# Patient Record
Sex: FEMALE | Employment: PART TIME
[De-identification: names, ages, dates, MRNs, and addresses within clinical notes are randomized per-mention and may not be internally consistent; named-entity substitution may affect disease eponyms.]

---

## 2017-03-01 DIAGNOSIS — Z20.828 EXPOSURE TO THE FLU: Primary | ICD-10-CM

## 2017-03-01 RX ORDER — OSELTAMIVIR PHOSPHATE 75 MG/1
75 CAPSULE ORAL DAILY
Qty: 10 CAPSULE | Refills: 0 | Status: SHIPPED | OUTPATIENT
Start: 2017-03-01 | End: 2018-03-20

## 2017-12-17 ENCOUNTER — HEALTH MAINTENANCE LETTER (OUTPATIENT)
Age: 42
End: 2017-12-17

## 2018-03-20 ENCOUNTER — OFFICE VISIT (OUTPATIENT)
Dept: FAMILY MEDICINE | Facility: CLINIC | Age: 43
End: 2018-03-20
Payer: COMMERCIAL

## 2018-03-20 ENCOUNTER — NURSE TRIAGE (OUTPATIENT)
Dept: NURSING | Facility: CLINIC | Age: 43
End: 2018-03-20

## 2018-03-20 VITALS
SYSTOLIC BLOOD PRESSURE: 128 MMHG | TEMPERATURE: 97.4 F | WEIGHT: 266 LBS | RESPIRATION RATE: 18 BRPM | BODY MASS INDEX: 39.86 KG/M2 | OXYGEN SATURATION: 97 % | DIASTOLIC BLOOD PRESSURE: 84 MMHG | HEART RATE: 83 BPM

## 2018-03-20 DIAGNOSIS — R10.11 RUQ ABDOMINAL PAIN: Primary | ICD-10-CM

## 2018-03-20 DIAGNOSIS — R30.0 DYSURIA: ICD-10-CM

## 2018-03-20 DIAGNOSIS — Z87.19 HISTORY OF GALLSTONES: ICD-10-CM

## 2018-03-20 LAB
ALBUMIN UR-MCNC: 30 MG/DL
APPEARANCE UR: CLEAR
BACTERIA #/AREA URNS HPF: ABNORMAL /HPF
BILIRUB UR QL STRIP: NEGATIVE
COLOR UR AUTO: YELLOW
GLUCOSE UR STRIP-MCNC: NEGATIVE MG/DL
HGB UR QL STRIP: ABNORMAL
KETONES UR STRIP-MCNC: NEGATIVE MG/DL
LEUKOCYTE ESTERASE UR QL STRIP: NEGATIVE
MUCOUS THREADS #/AREA URNS LPF: PRESENT /LPF
NITRATE UR QL: NEGATIVE
NON-SQ EPI CELLS #/AREA URNS LPF: ABNORMAL /LPF
PH UR STRIP: 6 PH (ref 5–7)
RBC #/AREA URNS AUTO: ABNORMAL /HPF
SOURCE: ABNORMAL
SP GR UR STRIP: 1.02 (ref 1–1.03)
UROBILINOGEN UR STRIP-ACNC: 1 EU/DL (ref 0.2–1)
WBC #/AREA URNS AUTO: ABNORMAL /HPF

## 2018-03-20 PROCEDURE — 99214 OFFICE O/P EST MOD 30 MIN: CPT | Performed by: NURSE PRACTITIONER

## 2018-03-20 PROCEDURE — 87086 URINE CULTURE/COLONY COUNT: CPT | Performed by: NURSE PRACTITIONER

## 2018-03-20 PROCEDURE — 81001 URINALYSIS AUTO W/SCOPE: CPT | Performed by: NURSE PRACTITIONER

## 2018-03-20 ASSESSMENT — PAIN SCALES - GENERAL: PAINLEVEL: WORST PAIN (10)

## 2018-03-20 NOTE — TELEPHONE ENCOUNTER
Patient states she was diagnosed with gall stones July of 2017 and is requesting to have surgery.  FNA advised that she would have to see a provider to initiate if that is what is needed; transferred to scheduling for appointment.

## 2018-03-20 NOTE — PROGRESS NOTES
SUBJECTIVE:   Minerva Andrade is a 42 year old female who presents to clinic today for the following health issues:      Abdominal Pain      Duration: On and Off for 7 months    Description (location/character/radiation): Upper right Abdomen       Associated flank pain: None    Intensity:  severe    Accompanying signs and symptoms:        Fever/Chills: no        Gas/Bloating: YES- Both       Nausea/vomitting: YES- Vomit has been green       Diarrhea: YES       Dysuria or Hematuria: no     History (previous similar pain/trauma/previous testing): 7/2017 was diagnosed with Gall Stones at [facility]    Precipitating or alleviating factors:       Pain worse with eating/BM/urination: with eating-greasy foods especially. Every other meal getting abdominal pain.        Pain relieved by BM: YES    Therapies tried and outcome: Rolaids-not helping    LMP:  Has IUD does not get menstrual cycle with this    Father passed away a few days after getting dx with gallstones last July 2017 so she did not get a chance to see a general surgeon. Pain has worsened since last July. Worse after greasy foods. She is ready to take care of this now.       Issue #2: +Dysuria and foul smelling urine. No blood in urine, mild lower abdominal pain. Will check UA/UC today. No fever/chills.         Problem list and histories reviewed & adjusted, as indicated.  Additional history: as documented    Patient Active Problem List   Diagnosis     BMI 30.0-30.9,adult     Hemoglobin C (Hb-C)   Alpha Thalessemia      Elevated BP     IUD (intrauterine device) in place     Chronic migraine without aura with status migrainosus, not intractable     History of gallstones     Past Surgical History:   Procedure Laterality Date     DILATION AND CURETTAGE SUCTION  2003/08/10    x 3       Social History   Substance Use Topics     Smoking status: Never Smoker     Smokeless tobacco: Never Used     Alcohol use Yes      Comment: 1-11 a year     Family History    Problem Relation Age of Onset     DIABETES Mother      Hypertension Mother      Arthritis Maternal Grandmother      Arthritis Maternal Aunt          Current Outpatient Prescriptions   Medication Sig Dispense Refill     SUMAtriptan (IMITREX) 100 MG tablet Take 1 tablet (100 mg) by mouth at onset of headache for migraine May repeat in 2 hours if needed: max 2/day (Patient not taking: Reported on 3/20/2018) 9 tablet 1     levonorgestrel (MIRENA) 20 MCG/24HR IUD 1 each (20 mcg) by Intrauterine route once for 1 dose 1 each 0     No Known Allergies    Reviewed and updated as needed this visit by clinical staff  Tobacco  Allergies  Meds  Problems  Med Hx  Surg Hx  Fam Hx  Soc Hx        Reviewed and updated as needed this visit by Provider  Allergies  Meds  Problems         ROS:  Constitutional, cardiovascular, pulmonary, gi -as above, and gu-as above, otherwise systems are negative, except as otherwise noted.    OBJECTIVE:     /84  Pulse 83  Temp 97.4  F (36.3  C) (Oral)  Resp 18  Wt 120.7 kg (266 lb)  SpO2 97%  BMI 39.86 kg/m2  Body mass index is 39.86 kg/(m^2).  GENERAL: healthy, alert and no distress  RESP: lungs clear to auscultation - no rales, rhonchi or wheezes  CV: regular rate and rhythm, normal S1 S2, no S3 or S4, no murmur, click or rub  ABDOMEN: soft, obese, tender in RUQ, bilateral lower quadrants, and right flank tender, no hepatosplenomegaly, no masses and bowel sounds hypoactive  MS: no gross musculoskeletal defects noted, no edema    Diagnostic Test Results:  Results for orders placed or performed in visit on 03/20/18 (from the past 24 hour(s))   *UA reflex to Microscopic   Result Value Ref Range    Color Urine Yellow     Appearance Urine Clear     Glucose Urine Negative NEG^Negative mg/dL    Bilirubin Urine Negative NEG^Negative    Ketones Urine Negative NEG^Negative mg/dL    Specific Gravity Urine 1.025 1.003 - 1.035    Blood Urine Large (A) NEG^Negative    pH Urine 6.0 5.0 - 7.0  pH    Protein Albumin Urine 30 (A) NEG^Negative mg/dL    Urobilinogen Urine 1.0 0.2 - 1.0 EU/dL    Nitrite Urine Negative NEG^Negative    Leukocyte Esterase Urine Negative NEG^Negative    Source Midstream Urine    Urine Microscopic   Result Value Ref Range    WBC Urine 0 - 5 OTO5^0 - 5 /HPF    RBC Urine  (A) OTO2^O - 2 /HPF    Squamous Epithelial /LPF Urine Few FEW^Few /LPF    Bacteria Urine Few (A) NEG^Negative /HPF    Mucous Urine Present (A) NEG^Negative /LPF       ASSESSMENT/PLAN:       1. RUQ abdominal pain  Pain worsening since last July 2017. Get US then make appointment with surgeon. Likely gallstones. She wants it surgically taken care of.   - US Abdomen Complete; Future  - GENERAL SURG ADULT REFERRAL    2. History of gallstones  As above  - US Abdomen Complete; Future  - GENERAL SURG ADULT REFERRAL    3. Dysuria  UA not conclusive for UTI, wait for UC. Push fluids, okay to try cranberry pills also.   - *UA reflex to Microscopic  -UC pending    FUTURE APPOINTMENTS:       - Follow-up visit prn symptoms not improving    KAREY Pitt, NP-C  [facility]

## 2018-03-20 NOTE — PATIENT INSTRUCTIONS
Ultrasound number: [phone]     General surgeon- [facility]: [facility]im[facility] [place] [phone]

## 2018-03-20 NOTE — MR AVS SNAPSHOT
After Visit Summary   3/20/2018    Minerva Andrade    MRN: [id number]           Patient Information     Date Of Birth          1975        Visit Information        Provider Department      3/20/2018 11:40 AM Angelica Santos NP [facility]        Today's Diagnoses     RUQ abdominal pain    -  1    History of gallstones        Dysuria          Care Instructions    Ultrasound number: [phone]     General surgeon- [facility]: [facility] [phone]            Follow-ups after your visit        Additional Services     GENERAL SURG ADULT REFERRAL       Your provider has referred you to: [facility]: [facility] [phone]   [email or web address]    Please be aware that coverage of these services is subject to the terms and limitations of your health insurance plan.  Call member services at your health plan with any benefit or coverage questions.      Please bring the following with you to your appointment:    (1) Any X-Rays, CTs or MRIs which have been performed.  Contact the facility where they were done to arrange for  prior to your scheduled appointment.   (2) List of current medications   (3) This referral request   (4) Any documents/labs given to you for this referral                  Future tests that were ordered for you today     Open Future Orders        Priority Expected Expires Ordered    US Abdomen Complete Routine  3/20/2019 3/20/2018            Who to contact     If you have questions or need follow up information about today's clinic visit or your schedule please contact [facility] directly at [phone].  Normal or non-critical lab and imaging results will be communicated to you by MyChart, letter or phone within 4 business days after the clinic has received the results. If you do not hear from us within 7 days, please contact the clinic through MyChart or phone.  If you have a critical or abnormal lab result, we will notify you by phone as soon as possible.  Submit refill requests through [employer] or call your pharmacy and they will forward the refill request to us. Please allow 3 business days for your refill to be completed.          Additional Information About Your Visit        [employer] Information     [employer] gives you secure access to your electronic health record. If you see a primary care provider, you can also send messages to your care team and make appointments. If you have questions, please call your primary care clinic.  If you do not have a primary care provider, please call [phone] and they will assist you.        Care EveryWhere ID     This is your Care EveryWhere ID. This could be used by other organizations to access your [facility] medical records  [id number]        Your Vitals Were     Pulse Temperature Respirations Pulse Oximetry BMI (Body Mass Index)       83 97.4  F (36.3  C) (Oral) 18 97% 39.86 kg/m2        Blood Pressure from Last 3 Encounters:   03/20/18 128/84   09/12/16 114/78   08/17/16 116/74    Weight from Last 3 Encounters:   03/20/18 266 lb (120.7 kg)   09/12/16 249 lb (112.9 kg)   08/17/16 250 lb 1.6 oz (113.4 kg)              We Performed the Following     *UA reflex to Microscopic     GENERAL SURG ADULT REFERRAL     Urine Culture Aerobic Bacterial     Urine Microscopic          Today's Medication Changes          These changes are accurate as of 3/20/18 12:42 PM.  If you have any questions, ask your nurse or doctor.               Stop taking these medicines if you haven't already. Please contact your care team if you have questions.     azithromycin 250 MG tablet   Commonly known as:  ZITHROMAX   Stopped by:  Angelica Santos NP           oseltamivir 75 MG capsule   Commonly known as:  TAMIFLU   Stopped by:  Angelica Santos NP                    Primary Care Provider Office Phone # Fax #    [facility] [phone]  [phone]       [address]  [facility] [address]        Equal Access to Services     DYLON ARGUELLO : Hadii aad ku hadbrynno Sodashawn, waterryda luqadaha, qaybta kaalmada fernandoradha, emmanuel franin hayaasue salasliontu haines. So [facility] [phone].    ATENCIÓN: Si habla español, tiene a pierson disposición servicios gratuitos de asistencia lingüística. Llame al [phone].    We comply with applicable federal civil rights laws and [place] laws. We do not discriminate on the basis of race, color, national origin, age, disability, sex, sexual orientation, or gender identity.            Thank you!     Thank you for choosing [facility]  for your care. Our goal is always to provide you with excellent care. Hearing back from our patients is one way we can continue to improve our services. Please take a few minutes to complete the written survey that you may receive in the mail after your visit with us. Thank you!             Your Updated Medication List - Protect others around you: Learn how to safely use, store and throw away your medicines at www.disposemymeds.org.          This list is accurate as of 3/20/18 12:42 PM.  Always use your most recent med list.                   Brand Name Dispense Instructions for use Diagnosis    levonorgestrel 20 MCG/24HR IUD    MIRENA    1 each    1 each (20 mcg) by Intrauterine route once for 1 dose    Encounter for IUD insertion, Pregnancy, status unknown       SUMAtriptan 100 MG tablet    IMITREX    9 tablet    Take 1 tablet (100 mg) by mouth at onset of headache for migraine May repeat in 2 hours if needed: max 2/day    Chronic migraine without aura with status migrainosus, not intractable

## 2018-03-21 LAB
BACTERIA SPEC CULT: NORMAL
SPECIMEN SOURCE: NORMAL

## 2018-04-04 ENCOUNTER — RADIANT APPOINTMENT (OUTPATIENT)
Dept: ULTRASOUND IMAGING | Facility: CLINIC | Age: 43
End: 2018-04-04
Attending: NURSE PRACTITIONER
Payer: COMMERCIAL

## 2018-04-04 DIAGNOSIS — R10.11 RUQ ABDOMINAL PAIN: ICD-10-CM

## 2018-04-04 DIAGNOSIS — Z87.19 HISTORY OF GALLSTONES: ICD-10-CM

## 2018-04-04 PROCEDURE — 76700 US EXAM ABDOM COMPLETE: CPT | Performed by: RADIOLOGY

## 2018-04-16 ENCOUNTER — TELEPHONE (OUTPATIENT)
Dept: SURGERY | Facility: CLINIC | Age: 43
End: 2018-04-16

## 2018-04-16 ENCOUNTER — OFFICE VISIT (OUTPATIENT)
Dept: SURGERY | Facility: CLINIC | Age: 43
End: 2018-04-16
Attending: NURSE PRACTITIONER
Payer: COMMERCIAL

## 2018-04-16 VITALS
BODY MASS INDEX: 39.4 KG/M2 | HEART RATE: 83 BPM | HEIGHT: 69 IN | DIASTOLIC BLOOD PRESSURE: 95 MMHG | OXYGEN SATURATION: 96 % | WEIGHT: 266 LBS | SYSTOLIC BLOOD PRESSURE: 136 MMHG

## 2018-04-16 DIAGNOSIS — K80.20 SYMPTOMATIC CHOLELITHIASIS: Primary | ICD-10-CM

## 2018-04-16 PROCEDURE — 99244 OFF/OP CNSLTJ NEW/EST MOD 40: CPT | Performed by: SURGERY

## 2018-04-16 ASSESSMENT — PAIN SCALES - GENERAL: PAINLEVEL: NO PAIN (0)

## 2018-04-16 NOTE — PATIENT INSTRUCTIONS
Surgery Instructions    Always follow your surgeon s instructions. If you don t, your surgery could be cancelled. Please use the following checklist.  Your surgery is on: The surgery scheduler will contact you within 1 week of your consult with the surgeon. If you do not hear from them, please call the clinic or RN Care Coordinator for your provider.    Time: Prearrival times can vary depending on location/type of surgery.  [facility] - 2 hour pre-arrival  [facility] - 2 hour pre-arrival  [place] - 1 hour pre-arrival    Note:  These times may change. A nurse will call you before surgery to confirm. If you have not received a call or if you have more questions, please call us on the working day before your surgery:  ? [place]: [phone] or [phone] (9am to 5:30pm)  ? [facility]: [phone] (8am to 6pm)  ? [place]: [phone] (9am to 5pm)  ? [facility] [phone] (7am to 4pm)  Prior to surgery  ? Have a pre-op physical exam with your Primary Doctor within 30 days of surgery  - Ask your doctor to send all of your results to the surgery center/hospital before surgery. Your doctor also may ask you to bring the results with you on the day of surgery.  - Tell your doctor if:  - You are allergic to latex or rubber (latex and rubber gloves are often used in medical care).  - You are taking any medicines (including aspirin), vitamins, or herbal products. You may need to stop taking some medicines before surgery.  - You have any medical problems (allergies, diabetes, or heart disease, for example).  - You have a pacemaker or an AICD (automatic implanted cardiac defibrillator). If you do, please bring the ID card with you on the day of surgery.  - People who smoke have a higher risk of infection after surgery. Ask your doctor how you can quit smoking.  - If you Primary Doctor is not within the [email or web address] system, you will need to have your pre-op physical faxed to us to be scanned into your  chart.  - [place]: [phone] or [phone]  - [facility] ([place]): [phone]  - [facility] ([facility]): [phone]  ? Call your insurance company. Ask if you need pre-approval for your surgery. If you do not have insurance, please let us know. If you wish to speak to the , please alert the clinic staff so this can be arranged.  ? Arrange for someone to drive you home after surgery.  will need to be a responsible adult (18 years or older) that will provide transportation to and from surgery and stay in the waiting room during your surgery. You may not drive yourself or take public transportation to and from surgery.  ? Arrange for someone to stay with you for 24 hours after you go home. This person must be a responsible adult (18 years or older).  ? Call your surgeon or their nurse if there is any change in your health (cold, flu, infections, hospitalizations).  ? Do not smoke, drink alcohol, or take over-the-counter medicine for 24 hours before and after surgery.  ? If you take prescribed drugs, you may need to stop them until after the surgery.  Discuss what medications to take or not take prior to surgery with your Primary Doctor at your pre-op physical. Avoid over-the-counter blood-thinning medications such as Aspirin, Ibuprofen, vitamin E, or fish oil 7 days prior to surgery (unless otherwise directed by your Primary Doctor). Tylenol is a good alternative for mild pain relief prior to surgery.  ? Eating and drinking guidelines prior to surgery:  - Stop all solid food consumption 8 hours prior to surgery  - You may drink clear liquids up to 2 hours prior to surgery (water, fruit juices without pulp, jello, tea/coffee without creamer, sports drinks, clear-fat free broth (bouillon or consomme), popsicles (without milk, bits of fruit, or seeds/nuts)  ? Follow instructions given for showering or bathing before surgery.    - Use 8 ounces of antiseptic surgical soap,  like:  - Hibiclens, Scrub Care, or Exidine  - You can find it at your local pharmacy, clinic, or retail store. If you have trouble, ask your pharmacist to help you find the right substitute.  - Please wash with one of the above soaps twice before coming to the hospital for your surgery. This will decrease bacteria (germs) on your skin. It will also help reduce your chance of infection after surgery.  - Items you will need for showering:  - 4 newly washed washcloths  - 2 newly washed towels  - 8 ounces of one of the above soaps  - Following these instructions:  - The evening before surgery: Shower or bathe as you normally would, using your regular soap and a clean washcloth. Give special attention to places where your incision (surgical cut) or catheters will be. This includes your groin area. Rinse well. You may wash your hair with your regular shampoo. Next, wash your body with 4 ounces of the antiseptic soap. Use a clean, damp washcloth and gently clean your body (from the chin down). If your surgery involves your head, use the special soap on your head and scalp. Rinse well and dry off using a newly washed towel.  - The morning of surgery: Repeat the same process as the evening shower.  - Other suggestions:    Do not shave within 12 inches of your incision (surgical cut) area for at least 3 days before surgery. Shaving can make small cuts in the skin. This puts you at higher risk of infection.    Wear freshly washed pajamas or clothing after your evening shower.    Wear freshly washed clothes the day of surgery.    Wash and change your bed sheets the day before surgery to have clean bed sheets after your shower and when you get home from surgery.    If you have trouble washing all areas, make sure someone helps you.    Don't use any deodorant, lotion or powder after your shower.    Women who are menstruating should wear a fresh sanitary pad to the hospital.  ? Do not wear or add deodorant, cologne, lotion,  makeup, nail polish or jewelry to surgery. If you wear fake nails, please remove at least one nail before coming to surgery (an oxygen monitor needs to be placed on your finger during surgery).  ? Bring these items to the surgery center/hospital:  - Insurance card  - Money for parking and co-pays, if needed  - A list of all the medicines you take. Include vitamins, minerals, herbs, and over-the-counter drugs.  Note any drug allergies.  - A copy of your advance health care directive, if you have one. This tells us what treatment you would want--and who would make health care decisions--if you could no longer speak for yourself. You may request this form in advance or download it from [email or web address].  - A case for glasses, contact lenses, hearing aids, or dentures.  - Your inhaler or CPAP machine, if you use these at home.  ? Leave extra cash, jewelry, and other valuables at home.  When you arrive  When you get to the surgery center/hospital, you will:  ? Check in. If you are under age 18, you must be with a parent or legal guardian.  ? Sign consent forms, if you haven t already. These forms state that you know the risks and benefits of surgery. When you sign the forms, you give us permission to do the surgery. Do not sign them unless you understand what will happen during and after your surgery. If you have any questions about your surgery, ask to speak with your doctor before you sign the forms. If you don t understand the answers, ask again.  ? Receive a copy of the Patient s Bill of Rights. If you do not receive a copy, please ask for one.  ? Change into hospital clothes. Your belongings will be placed in a bag. We will return them to you after surgery.  ? Meet with the anesthesia provider. He or she will tell you what kind of anesthesia (medicine) will be used to keep you comfortable during surgery.  Remember: it s okay to remind doctors and nurses to wash their hands before touching you.  In most  cases, your surgeon will use a marker to write his or her initials on the surgery site. This ensures that the exact site is operated on.  For safety reasons, we will ask you the same questions many times. For example, we may ask your name and birth date over and over again.  Friends and family can stay with you until it s time for surgery. While you re in surgery, they will be in the waiting area. Please note that cell phones are not allowed in some patient care areas.  If you have questions about what will happen in the operating room, talk to your care team.  After surgery  We will move you to a recovery room, where we will watch you closely. If you have any pain or discomfort, tell your nurse. He or she will try to make you comfortable.  If you are staying overnight, we will move you to your hospital room after you are awake.  If you are going home, we will move you to another room. Friends and family may be able to join you. The length of time you spend in recovery depend on the type of medicine you received, your medical condition, the type of surgery you had, or your response to the anesthesia given during your procedure.  When you are discharged from the recovery room, the nurses will review instructions with you and your caregiver.  ? Please wash your hands every time you touch the wound or change bandages or dressings.  ? Do not submerge the wound in water.  You may not use a bathtub or hot tub until the wound is closed. The wait time frame is generally 2-3 weeks, but any open area can be a source of incoming bacteria, so it is better to be on the safe side and avoid water submersion until your wound is fully healed.  ? You may take a shower 24 hours after surgery. Double check with your surgeon if it is OK for water to run over the wound, whether it has been sutured, stapled, glued, or is open. You may gently wash the wound using the antiseptic soap provided for your pre-surgery showering (do not use a  washcloth). Any mild soap will work as well.  ? Many surgical wounds will have small white strips of tape on them called steri-strips.  Do not remove these. The edges will curl and fall off within 7-10 days with normal showering.  ? If you are going home with sutures (stitches) or staples, you must return to the clinic to have them taken out, usually within 1-2 weeks. Some stitches are dissolvable and do not require removal. Make sure to clarify with your surgeon or surgery nurse reviewing discharge paperwork what kind of sutures you have.  ? Signs and symptoms of infection include:  - Fever, temperature over 101.5   F  - Redness  - Swelling  - Increased pain  - Green or yellow drainage which may or may not have a foul odor  Dealing with pain  A nurse will check your comfort level often during your stay. He or she will work with you to manage your pain.  Remember:  ? All pain is real. There are many ways to control pain. We can help you decide what works best for you.  ? Ask for pain medicine when you need it. Don t try to  tough it out --this can make you feel worse. Always take your medicine as ordered.  ? Medicine doesn t work the same for everyone. If your medicine isn t working, tell your nurse. There may be other medicines or treatments we can try.  Going home  We will let you know when you re ready to leave the surgery center or hospital. Before you leave, we will tell you how to care for yourself at home and prevent infections. If you do not understand something, please say so. We will answer any questions you have. We will then help you get ready to leave.  Remember, you must have a responsible adult (18 years or older) to stay with you 24 hours after you leave the hospital.  Take it easy when you get home. You will need some time to recover--you may be more tired than you realize at first. Rest and relax for at least the first 24 hours at home. You ll feel better and heal faster if you take good care of  yourself.  Follow the discharge instructions that are given to you when you leave the surgery center or hospital  Please call the clinic if you experience any problems during regular clinic hours (Monday-Friday 8:00am-5:00pm).  If you experience problems during non-clinic hours, please call the [facility] on-call line at [phone] and ask the  to page the on-call Provider for your specialty. The on-call Provider will call you back and can triage your symptoms and further advise. If you are having an emergency, always call 911 or seek immediate evaluation at the Emergency Room.  Locations  [facility]  Same-day surgery center - 2nd floor, check-in #5  [address] [place] [address]  [phone]  www.[email or web address].[facility] ([facility]  [address] [place], [place] [address] [phone]   [email or web address]    [address] [address] [phone] (patient registration)  [phone] (main line)  www.[facility].org  [facility]  [address] [place] [address]  [facility], 3rd floor for check-in  [phone] (patient registration)  [phone] (main line)  www.[facility].org  [facility]  [address] [address]  [phone]  www.[facility]  [address] [address]  [phone]  www.[facility].[place].[facility]  [address] [place] [address]  [phone]  www.[facility]  [address]  [place] [address]  [phone]  www.[facility].[place].[facility]  [address]  [place] [address]  [phone]  [email or web address].org  [address] [address]  [phone]  [email or web address]

## 2018-04-16 NOTE — TELEPHONE ENCOUNTER
Procedure name(s) - multi select laparoscopic cholecystectomy    Is this a multi surgeon case? No    Laterality N/A    Reason for procedure symptomatic cholelithiasis    Location of Case: [facility]    Surgeon Procedure Time (incision to closure) in minutes (per procedure as applicable) 90    Note:  Surgical Case Time Needed (in minutes)   Patient Class (for admit prior to surgery, specify number of days in comments): Same day (surgery center outpatient)    Anesthesia General    H&P To Be Completed By: PCP    Post-Op Appointment 4 weeks    Vendor Needed? No      BMI- 39.86 kg    Amorrdale Camarena CMA

## 2018-04-16 NOTE — TELEPHONE ENCOUNTER
Date Scheduled: 5-7-18  Facility: [facility]  Surgeon: Dr. Medina   Post-op appointment scheduled:    Jun 04, 2018  3:00 PM CDT   [facility]    [address] [place] [id number] [phone]                 scheduled?: No  Surgery packet/instructions confirmed received?  Yes  Special Considerations:   [facility], Surgery Scheduling Coordinator

## 2018-04-16 NOTE — MR AVS SNAPSHOT
After Visit Summary   4/16/2018    Minerva Andrade    MRN: [id number]           Patient Information     Date Of Birth          1975        Visit Information        Provider Department      4/16/2018 9:30 AM Alicia Medina MD [facility]        Today's Diagnoses     Symptomatic cholelithiasis    -  1      Care Instructions    Surgery Instructions    Always follow your surgeon s instructions. If you don t, your surgery could be cancelled. Please use the following checklist.  Your surgery is on: The surgery scheduler will contact you within 1 week of your consult with the surgeon. If you do not hear from them, please call the clinic or RN Care Coordinator for your provider.    Time: Prearrival times can vary depending on location/type of surgery.  [place] - 2 hour pre-arrival  [facility] [place] [place] - 2 hour pre-arrival  [place] - 1 hour pre-arrival    Note:  These times may change. A nurse will call you before surgery to confirm. If you have not received a call or if you have more questions, please call us on the working day before your surgery:  ? [place]: [phone] or [phone] (9am to 5:30pm)  ? [facility] [place] [place]: [phone] (8am to 6pm)  ? [place]: [phone] (9am to 5pm)  ? [facility] [phone] (7am to 4pm)  Prior to surgery  ? Have a pre-op physical exam with your Primary Doctor within 30 days of surgery  - Ask your doctor to send all of your results to the surgery center/hospital before surgery. Your doctor also may ask you to bring the results with you on the day of surgery.  - Tell your doctor if:  - You are allergic to latex or rubber (latex and rubber gloves are often used in medical care).  - You are taking any medicines (including aspirin), vitamins, or herbal products. You may need to stop taking some medicines before surgery.  - You have any medical problems (allergies, diabetes, or heart disease, for example).  - You have a pacemaker or an AICD  (automatic implanted cardiac defibrillator). If you do, please bring the ID card with you on the day of surgery.  - People who smoke have a higher risk of infection after surgery. Ask your doctor how you can quit smoking.  - If you Primary Doctor is not within the [employer] system, you will need to have your pre-op physical faxed to us to be scanned into your chart.  - [place]: [phone] or [phone]  - [facility] ([place]): [phone]  - [facility] ([facility] [place]): [phone]  ? Call your insurance company. Ask if you need pre-approval for your surgery. If you do not have insurance, please let us know. If you wish to speak to the , please alert the clinic staff so this can be arranged.  ? Arrange for someone to drive you home after surgery.  will need to be a responsible adult (18 years or older) that will provide transportation to and from surgery and stay in the waiting room during your surgery. You may not drive yourself or take public transportation to and from surgery.  ? Arrange for someone to stay with you for 24 hours after you go home. This person must be a responsible adult (18 years or older).  ? Call your surgeon or their nurse if there is any change in your health (cold, flu, infections, hospitalizations).  ? Do not smoke, drink alcohol, or take over-the-counter medicine for 24 hours before and after surgery.  ? If you take prescribed drugs, you may need to stop them until after the surgery.  Discuss what medications to take or not take prior to surgery with your Primary Doctor at your pre-op physical. Avoid over-the-counter blood-thinning medications such as Aspirin, Ibuprofen, vitamin E, or fish oil 7 days prior to surgery (unless otherwise directed by your Primary Doctor). Tylenol is a good alternative for mild pain relief prior to surgery.  ? Eating and drinking guidelines prior to surgery:  - Stop all solid food consumption 8 hours prior to  surgery  - You may drink clear liquids up to 2 hours prior to surgery (water, fruit juices without pulp, jello, tea/coffee without creamer, sports drinks, clear-fat free broth (bouillon or consomme), popsicles (without milk, bits of fruit, or seeds/nuts)  ? Follow instructions given for showering or bathing before surgery.    - Use 8 ounces of antiseptic surgical soap, like:  - Hibiclens, Scrub Care, or Exidine  - You can find it at your local pharmacy, clinic, or retail store. If you have trouble, ask your pharmacist to help you find the right substitute.  - Please wash with one of the above soaps twice before coming to the hospital for your surgery. This will decrease bacteria (germs) on your skin. It will also help reduce your chance of infection after surgery.  - Items you will need for showering:  - 4 newly washed washcloths  - 2 newly washed towels  - 8 ounces of one of the above soaps  - Following these instructions:  - The evening before surgery: Shower or bathe as you normally would, using your regular soap and a clean washcloth. Give special attention to places where your incision (surgical cut) or catheters will be. This includes your groin area. Rinse well. You may wash your hair with your regular shampoo. Next, wash your body with 4 ounces of the antiseptic soap. Use a clean, damp washcloth and gently clean your body (from the chin down). If your surgery involves your head, use the special soap on your head and scalp. Rinse well and dry off using a newly washed towel.  - The morning of surgery: Repeat the same process as the evening shower.  - Other suggestions:    Do not shave within 12 inches of your incision (surgical cut) area for at least 3 days before surgery. Shaving can make small cuts in the skin. This puts you at higher risk of infection.    Wear freshly washed pajamas or clothing after your evening shower.    Wear freshly washed clothes the day of surgery.    Wash and change your bed sheets  the day before surgery to have clean bed sheets after your shower and when you get home from surgery.    If you have trouble washing all areas, make sure someone helps you.    Don't use any deodorant, lotion or powder after your shower.    Women who are menstruating should wear a fresh sanitary pad to the hospital.  ? Do not wear or add deodorant, cologne, lotion, makeup, nail polish or jewelry to surgery. If you wear fake nails, please remove at least one nail before coming to surgery (an oxygen monitor needs to be placed on your finger during surgery).  ? Bring these items to the surgery center/hospital:  - Insurance card  - Money for parking and co-pays, if needed  - A list of all the medicines you take. Include vitamins, minerals, herbs, and over-the-counter drugs.  Note any drug allergies.  - A copy of your advance health care directive, if you have one. This tells us what treatment you would want--and who would make health care decisions--if you could no longer speak for yourself. You may request this form in advance or download it from [email or web address].  - A case for glasses, contact lenses, hearing aids, or dentures.  - Your inhaler or CPAP machine, if you use these at home.  ? Leave extra cash, jewelry, and other valuables at home.  When you arrive  When you get to the surgery center/hospital, you will:  ? Check in. If you are under age 18, you must be with a parent or legal guardian.  ? Sign consent forms, if you haven t already. These forms state that you know the risks and benefits of surgery. When you sign the forms, you give us permission to do the surgery. Do not sign them unless you understand what will happen during and after your surgery. If you have any questions about your surgery, ask to speak with your doctor before you sign the forms. If you don t understand the answers, ask again.  ? Receive a copy of the Patient s Bill of Rights. If you do not receive a copy, please ask for  one.  ? Change into hospital clothes. Your belongings will be placed in a bag. We will return them to you after surgery.  ? Meet with the anesthesia provider. He or she will tell you what kind of anesthesia (medicine) will be used to keep you comfortable during surgery.  Remember: it s okay to remind doctors and nurses to wash their hands before touching you.  In most cases, your surgeon will use a marker to write his or her initials on the surgery site. This ensures that the exact site is operated on.  For safety reasons, we will ask you the same questions many times. For example, we may ask your name and birth date over and over again.  Friends and family can stay with you until it s time for surgery. While you re in surgery, they will be in the waiting area. Please note that cell phones are not allowed in some patient care areas.  If you have questions about what will happen in the operating room, talk to your care team.  After surgery  We will move you to a recovery room, where we will watch you closely. If you have any pain or discomfort, tell your nurse. He or she will try to make you comfortable.  If you are staying overnight, we will move you to your hospital room after you are awake.  If you are going home, we will move you to another room. Friends and family may be able to join you. The length of time you spend in recovery depend on the type of medicine you received, your medical condition, the type of surgery you had, or your response to the anesthesia given during your procedure.  When you are discharged from the recovery room, the nurses will review instructions with you and your caregiver.  ? Please wash your hands every time you touch the wound or change bandages or dressings.  ? Do not submerge the wound in water.  You may not use a bathtub or hot tub until the wound is closed. The wait time frame is generally 2-3 weeks, but any open area can be a source of incoming bacteria, so it is better to be on  the safe side and avoid water submersion until your wound is fully healed.  ? You may take a shower 24 hours after surgery. Double check with your surgeon if it is OK for water to run over the wound, whether it has been sutured, stapled, glued, or is open. You may gently wash the wound using the antiseptic soap provided for your pre-surgery showering (do not use a washcloth). Any mild soap will work as well.  ? Many surgical wounds will have small white strips of tape on them called steri-strips.  Do not remove these. The edges will curl and fall off within 7-10 days with normal showering.  ? If you are going home with sutures (stitches) or staples, you must return to the clinic to have them taken out, usually within 1-2 weeks. Some stitches are dissolvable and do not require removal. Make sure to clarify with your surgeon or surgery nurse reviewing discharge paperwork what kind of sutures you have.  ? Signs and symptoms of infection include:  - Fever, temperature over 101.5   F  - Redness  - Swelling  - Increased pain  - Green or yellow drainage which may or may not have a foul odor  Dealing with pain  A nurse will check your comfort level often during your stay. He or she will work with you to manage your pain.  Remember:  ? All pain is real. There are many ways to control pain. We can help you decide what works best for you.  ? Ask for pain medicine when you need it. Don t try to  tough it out --this can make you feel worse. Always take your medicine as ordered.  ? Medicine doesn t work the same for everyone. If your medicine isn t working, tell your nurse. There may be other medicines or treatments we can try.  Going home  We will let you know when you re ready to leave the surgery center or hospital. Before you leave, we will tell you how to care for yourself at home and prevent infections. If you do not understand something, please say so. We will answer any questions you have. We will then help you get ready  to leave.  Remember, you must have a responsible adult (18 years or older) to stay with you 24 hours after you leave the hospital.  Take it easy when you get home. You will need some time to recover--you may be more tired than you realize at first. Rest and relax for at least the first 24 hours at home. You ll feel better and heal faster if you take good care of yourself.  Follow the discharge instructions that are given to you when you leave the surgery center or hospital  Please call the clinic if you experience any problems during regular clinic hours (Monday-Friday 8:00am-5:00pm).  If you experience problems during non-clinic hours, please call the [facility] on-call line at [phone] and ask the  to page the on-call Provider for your specialty. The on-call Provider will call you back and can triage your symptoms and further advise. If you are having an emergency, always call 911 or seek immediate evaluation at the Emergency Room.  Locations  [facility]  Same-day surgery center - 2nd floor, check-in #5  [address] [place] [address]  [phone]  [email or web address] ([facility]  [address] [place] [address]  [phone]   [email or web address]    [address] [address]  [phone] (patient registration)  [phone] (main line)  [email or web address]  [facility]  [address]  [place], [place] [address] [place] [address], 3rd floor for check-in  [phone] (patient registration)  [phone] (main line)  [email or web address]  [facility]  [address] [place] [address]  [phone]  [email or web address]  [address] [place] [address]  [phone]  [email or web address]  [facility]  [address] [place] [address]  [phone]  [email or web address]  [facility]  [address] [place] [address]  [phone]  [email or web address]  [facility]  [address] [place] [address]  [phone]  [email or web address]  [address] [place], [place] [address] [phone]  [email or web address]                Follow-ups after your visit        Who to contact     If you have questions or need follow up information about today's clinic visit or your schedule please contact [facility] directly at [phone].  Normal or non-critical lab and imaging results will be communicated to you by [employer], letter or phone within 4 business days after the clinic has received the results. If you do not hear from us within 7 days, please contact the clinic through [employer] or phone. If you have a critical or abnormal lab result, we will notify you by phone as soon as possible.  Submit refill requests through [employer] or call your pharmacy and they will forward the refill request to us. Please allow 3 business days for your refill to be completed.          Additional Information About Your Visit        [employer] Information     [employer] gives you secure access to your electronic health record. If you see a primary care provider, you can also send messages to your care team and make appointments. If you have questions, please call your primary care clinic.  If you do not have a primary care provider, please call [phone] and they will assist you.      [employer] is an electronic gateway that provides easy, online access to your medical records. With [employer], you can request a clinic appointment, read your test results, renew a prescription  "or communicate with your care team.     To access your existing account, please contact your [facility] or call [phone] for assistance.        Care EveryWhere ID     This is your Care EveryWhere ID. This could be used by other organizations to access your [place] medical records  [id number]        Your Vitals Were     Pulse Height Pulse Oximetry BMI (Body Mass Index)          83 1.74 m (5' 8.5\") 96% 39.86 kg/m2         Blood Pressure from Last 3 Encounters:   04/16/18 (!) 136/95   03/20/18 128/84   09/12/16 114/78    Weight from Last 3 Encounters:   04/16/18 120.7 kg (266 lb)   03/20/18 120.7 kg (266 lb)   09/12/16 112.9 kg (249 lb)              We Performed the Following     Page-Operative Worksheet        Primary Care Provider Office Phone # Fax #    [facility] [phone]       [address]  [facility] [address]        Equal Access to Services     [facility]: Hadii aad ku hadasho Soomaali, waaxda luqadaha, qaybta kaalmada adeegyada, waxay idiin haybenoit nance . So [facility] [phone].    ATENCIÓN: Si habla español, tiene a pierson disposición servicios gratuitos de asistencia lingüística. Llame al [phone].    We comply with applicable federal civil rights laws and [place] laws. We do not discriminate on the basis of race, color, national origin, age, disability, sex, sexual orientation, or gender identity.            Thank you!     Thank you for choosing [facility]  for your care. Our goal is always to provide you with excellent care. Hearing back from our patients is one way we can continue to improve our services. Please take a few minutes to complete the written survey that you may receive in the mail after your visit with us. Thank you!             Your Updated Medication List - Protect others around you: Learn how to safely use, store and throw away your medicines at www.disposemymeds.org. "          This list is accurate as of 4/16/18  9:58 AM.  Always use your most recent med list.                   Brand Name Dispense Instructions for use Diagnosis    levonorgestrel 20 MCG/24HR IUD    MIRENA    1 each    1 each (20 mcg) by Intrauterine route once for 1 dose    Encounter for IUD insertion, Pregnancy, status unknown       SUMAtriptan 100 MG tablet    IMITREX    9 tablet    Take 1 tablet (100 mg) by mouth at onset of headache for migraine May repeat in 2 hours if needed: max 2/day    Chronic migraine without aura with status migrainosus, not intractable

## 2018-04-16 NOTE — PROGRESS NOTES
Chief Complaint: RUQ and epigastric pain    History of Present Illness:   42 year old woman sent in consultation by Angelica Santos NP for evaluation of RUQ and epigastric pain. This occurs about once a week, a dull ache which does not radiate. She notes that it is instigated by greasy food intake and lasts for about 2-3 hours, sometimes associated with nausea and vomiting. It is sometimes alleviated with antacid intake, but not reliably. The pain was severe enough once to necessitate an ED visit last summer, at which time, she was given some pain medication which alleviated her symptoms at that time. Since then, she continues to have symptoms as noted above. She denies any dark urine or acholic stool.       Past Medical History:   Diagnosis Date     NO ACTIVE PROBLEMS (aka NONE)      Past Surgical History:   Procedure Laterality Date     DILATION AND CURETTAGE SUCTION  2003/08/10    x 3     Current Outpatient Prescriptions   Medication     SUMAtriptan (IMITREX) 100 MG tablet     levonorgestrel (MIRENA) 20 MCG/24HR IUD     No current facility-administered medications for this visit.       No Known Allergies  Social History     Social History     Marital status: Single     Spouse name: Michael Andrade     Number of children: 0     Years of education: 16     Occupational History     Works currently in sales      Social History Main Topics     Smoking status: Never Smoker     Smokeless tobacco: Never Used     Alcohol use Yes      Comment: 1-11 a year     Drug use: No     Sexual activity: Yes     Partners: Male     Birth control/ protection: IUD      Comment: None     Other Topics Concern     Not on file     Social History Narrative     Family History   Problem Relation Age of Onset     DIABETES Mother      Hypertension Mother      Arthritis Maternal Grandmother      Arthritis Maternal Aunt            Review of Systems:  No chest pain, weight loss or night sweats. Fevers- undocumented Dyspnea with minimal exertion, 80 lbs weight  "gain over 1.5-2 years  All other systems questioned and negative.     Exam:  Vital signs for exam: BP (!) 136/95  Pulse 83  Ht 1.74 m (5' 8.5\")  Wt 120.7 kg (266 lb)  SpO2 96%  BMI 39.86 kg/m2  Constitutional: healthy, alert and no distress.  Head: Normocephalic. No masses, lesions, tenderness or abnormalities.  ENT: ENT exam normal, no neck nodes or sinus tenderness.  Cardiovascular: Normal S1, S2, no murmurs  Respiratory: Clear to auscultation.   Gastrointestinal:  Obese, abdomen soft, mild tenderness in RUQ. No masses, organomegaly.  : Deferred  Musculoskeletal: extremities normal- no gross deformities noted and normal muscle tone  Skin: no jaundice, rashes or petechiae.   Neurologic: Gait normal.  Psychiatric: Mentation appears normal and affect normal.      Radiology:   US: cholelithiasis, normal bile duct caliber      IMPRESSION AND PLAN:  Symptomatic cholelithiasis in an obese 42 year old woman. We discussed options and she would like to proceed with a laparoscopic cholecystectomy. Risks of the procedure explained to the patient, including bleeding, infection, visceral injury, bile duct injury, retained stone, and conversion to open. The patient accepts and is willing to proceed.     "

## 2018-04-16 NOTE — NURSING NOTE
"Minerva Andrade's goals for this visit include:   Chief Complaint   Patient presents with     Consult     gallstones        She requests these members of her care team be copied on today's visit information: yes    PCP: [facility]    Referring Provider:  Angelica Santos NP  [address] [place] [place] [address]    Chief Complaint   Patient presents with     Consult     gallstones        Initial BP (!) 136/95  Pulse 83  Ht 1.74 m (5' 8.5\")  Wt 120.7 kg (266 lb)  SpO2 96%  BMI 39.86 kg/m2 Estimated body mass index is 39.86 kg/(m^2) as calculated from the following:    Height as of this encounter: 1.74 m (5' 8.5\").    Weight as of this encounter: 120.7 kg (266 lb).  Medication Reconciliation: complete    Do you need any medication refills at today's visit? New patient     [place] Camarena CMA        "

## 2018-04-18 ENCOUNTER — RADIANT APPOINTMENT (OUTPATIENT)
Dept: GENERAL RADIOLOGY | Facility: CLINIC | Age: 43
End: 2018-04-18
Attending: NURSE PRACTITIONER
Payer: COMMERCIAL

## 2018-04-18 ENCOUNTER — OFFICE VISIT (OUTPATIENT)
Dept: FAMILY MEDICINE | Facility: CLINIC | Age: 43
End: 2018-04-18
Payer: COMMERCIAL

## 2018-04-18 VITALS
HEIGHT: 69 IN | HEART RATE: 102 BPM | OXYGEN SATURATION: 97 % | WEIGHT: 270.4 LBS | TEMPERATURE: 98.8 F | SYSTOLIC BLOOD PRESSURE: 118 MMHG | RESPIRATION RATE: 20 BRPM | BODY MASS INDEX: 40.05 KG/M2 | DIASTOLIC BLOOD PRESSURE: 84 MMHG

## 2018-04-18 DIAGNOSIS — R06.02 SOB (SHORTNESS OF BREATH): ICD-10-CM

## 2018-04-18 DIAGNOSIS — Z87.19 HISTORY OF GALLSTONES: ICD-10-CM

## 2018-04-18 DIAGNOSIS — E66.01 MORBID OBESITY (H): ICD-10-CM

## 2018-04-18 DIAGNOSIS — Z01.818 PREOP GENERAL PHYSICAL EXAM: Primary | ICD-10-CM

## 2018-04-18 LAB
ERYTHROCYTE [DISTWIDTH] IN BLOOD BY AUTOMATED COUNT: 15.5 % (ref 10–15)
HCT VFR BLD AUTO: 33.9 % (ref 35–47)
HGB BLD-MCNC: 11.9 G/DL (ref 11.7–15.7)
MCH RBC QN AUTO: 26 PG (ref 26.5–33)
MCHC RBC AUTO-ENTMCNC: 35.1 G/DL (ref 31.5–36.5)
MCV RBC AUTO: 74 FL (ref 78–100)
PLATELET # BLD AUTO: 243 10E9/L (ref 150–450)
RBC # BLD AUTO: 4.57 10E12/L (ref 3.8–5.2)
TSH SERPL DL<=0.005 MIU/L-ACNC: 1.4 MU/L (ref 0.4–4)
WBC # BLD AUTO: 4.3 10E9/L (ref 4–11)

## 2018-04-18 PROCEDURE — 80048 BASIC METABOLIC PNL TOTAL CA: CPT | Performed by: NURSE PRACTITIONER

## 2018-04-18 PROCEDURE — 85027 COMPLETE CBC AUTOMATED: CPT | Performed by: NURSE PRACTITIONER

## 2018-04-18 PROCEDURE — 93000 ELECTROCARDIOGRAM COMPLETE: CPT | Performed by: NURSE PRACTITIONER

## 2018-04-18 PROCEDURE — 36415 COLL VENOUS BLD VENIPUNCTURE: CPT | Performed by: NURSE PRACTITIONER

## 2018-04-18 PROCEDURE — 84443 ASSAY THYROID STIM HORMONE: CPT | Performed by: NURSE PRACTITIONER

## 2018-04-18 PROCEDURE — 99214 OFFICE O/P EST MOD 30 MIN: CPT | Performed by: NURSE PRACTITIONER

## 2018-04-18 PROCEDURE — 71046 X-RAY EXAM CHEST 2 VIEWS: CPT | Mod: FY | Performed by: FAMILY MEDICINE

## 2018-04-18 RX ORDER — ALBUTEROL SULFATE 90 UG/1
2 AEROSOL, METERED RESPIRATORY (INHALATION) EVERY 6 HOURS PRN
Qty: 1 INHALER | Refills: 1 | Status: SHIPPED | OUTPATIENT
Start: 2018-04-18 | End: 2023-05-10

## 2018-04-18 ASSESSMENT — PAIN SCALES - GENERAL: PAINLEVEL: NO PAIN (0)

## 2018-04-18 NOTE — MR AVS SNAPSHOT
After Visit Summary   4/18/2018    Mienrva Andrade    MRN: [id number]           Patient Information     Date Of Birth          1975        Visit Information        Provider Department      4/18/2018 2:40 PM Angelica Santos NP [facility]        Today's Diagnoses     Preop general physical exam    -  1    History of gallstones        SOB (shortness of breath)        Morbid obesity (H)        BMI 40.0-44.9, adult (H)          Care Instructions      Before Your Surgery      Call your surgeon if there is any change in your health. This includes signs of a cold or flu (such as a sore throat, runny nose, cough, rash or fever).    Do not smoke, drink alcohol or take over the counter medicine (unless your surgeon or primary care doctor tells you to) for the 24 hours before and after surgery.    If you take prescribed drugs: Follow your doctor s orders about which medicines to take and which to stop until after surgery.    Eating and drinking prior to surgery: follow the instructions from your surgeon    Take a shower or bath the night before surgery. Use the soap your surgeon gave you to gently clean your skin. If you do not have soap from your surgeon, use your regular soap. Do not shave or scrub the surgery site.  Wear clean pajamas and have clean sheets on your bed.           Follow-ups after your visit        Your next 10 appointments already scheduled     May 07, 2018   Procedure with Alicia Medina MD   [facility] (--)    [address] [place] [id number]   [phone]            Jun 04, 2018  3:00 PM CDT   Return Visit with Alicia Medina MD   [facility] ([facility])    [address] [place] [address]   [phone]              Who to contact     If you have questions or need follow up information about today's clinic visit or your schedule please contact [facility] directly at  "[phone].  Normal or non-critical lab and imaging results will be communicated to you by PublicRelayhart, letter or phone within 4 business days after the clinic has received the results. If you do not hear from us within 7 days, please contact the clinic through PowerSmartt or phone. If you have a critical or abnormal lab result, we will notify you by phone as soon as possible.  Submit refill requests through Henable or call your pharmacy and they will forward the refill request to us. Please allow 3 business days for your refill to be completed.          Additional Information About Your Visit        PublicRelayhart Information     Henable gives you secure access to your electronic health record. If you see a primary care provider, you can also send messages to your care team and make appointments. If you have questions, please call your primary care clinic.  If you do not have a primary care provider, please call [phone] and they will assist you.        Care EveryWhere ID     This is your Care EveryWhere ID. This could be used by other organizations to access your [facility] medical records  [id number]        Your Vitals Were     Pulse Temperature Respirations Height Pulse Oximetry BMI (Body Mass Index)    102 98.8  F (37.1  C) (Oral) 20 1.74 m (5' 8.5\") 97% 40.52 kg/m2       Blood Pressure from Last 3 Encounters:   04/18/18 118/84   04/16/18 (!) 136/95   03/20/18 128/84    Weight from Last 3 Encounters:   04/18/18 122.7 kg (270 lb 6.4 oz)   04/16/18 120.7 kg (266 lb)   03/20/18 120.7 kg (266 lb)              We Performed the Following     Basic metabolic panel     CBC with platelets     EKG 12-lead complete w/read - Clinics     TSH with free T4 reflex     XR Chest 2 Views          Today's Medication Changes          These changes are accurate as of 4/18/18 11:59 PM.  If you have any questions, ask your nurse or doctor.               Start taking these medicines.        Dose/Directions    albuterol 108 (90 Base) MCG/ACT " Inhaler   Commonly known as:  PROAIR HFA/PROVENTIL HFA/VENTOLIN HFA   Used for:  SOB (shortness of breath)   Started by:  Angelica Santos NP        Dose:  2 puff   Inhale 2 puffs into the lungs every 6 hours as needed for shortness of breath / dyspnea or wheezing   Quantity:  1 Inhaler   Refills:  1         Stop taking these medicines if you haven't already. Please contact your care team if you have questions.     SUMAtriptan 100 MG tablet   Commonly known as:  IMITREX   Stopped by:  Angelica Santos NP                Where to get your medicines      These medications were sent to [facility]/pharmacy #[id number] - [facility] [address] [facility], [place] [address], [facility] [address]     Phone:  [phone]     albuterol 108 (90 Base) MCG/ACT Inhaler                Primary Care Provider Office Phone # Fax #    Angelica Santos [phone] [address] [facility] [address]        Equal Access to Services     [facility]: Hadii aad ku hadasho Soomaali, waaxda luqadaha, qaybta kaalmada adeegyada, waxay idiin haybenoit nance . So [facility] [phone].    ATENCIÓN: Si habla español, tiene a pierson disposición servicios gratuitos de asistencia lingüística. Llame al [phone].    We comply with applicable federal civil rights laws and [place] laws. We do not discriminate on the basis of race, color, national origin, age, disability, sex, sexual orientation, or gender identity.            Thank you!     Thank you for choosing [facility]  for your care. Our goal is always to provide you with excellent care. Hearing back from our patients is one way we can continue to improve our services. Please take a few minutes to complete the written survey that you may receive in the mail after your visit with us. Thank you!             Your Updated Medication List - Protect others around you: Learn how to safely use, store and throw away your  medicines at www.disposemymeds.org.          This list is accurate as of 4/18/18 11:59 PM.  Always use your most recent med list.                   Brand Name Dispense Instructions for use Diagnosis    albuterol 108 (90 Base) MCG/ACT Inhaler    PROAIR HFA/PROVENTIL HFA/VENTOLIN HFA    1 Inhaler    Inhale 2 puffs into the lungs every 6 hours as needed for shortness of breath / dyspnea or wheezing    SOB (shortness of breath)       levonorgestrel 20 MCG/24HR IUD    MIRENA    1 each    1 each (20 mcg) by Intrauterine route once for 1 dose    Encounter for IUD insertion, Pregnancy, status unknown

## 2018-04-18 NOTE — PROGRESS NOTES
[address] [address]  [phone]  Dept: [phone]    PRE-OP EVALUATION:  Today's date: 2018    Minerva Andrade (: 1975) presents for pre-operative evaluation assessment as requested by Dr. Medina.  She requires evaluation and anesthesia risk assessment prior to undergoing surgery/procedure for treatment of Gall bladder Removal .    Proposed Surgery/ Procedure: Gall bladder Removal  Date of Surgery/ Procedure: 18  Time of Surgery/ Procedure: 12:00pm  Hospital/Surgical Facility: [facility]  Fax number for surgical facility: [phone]  Primary Physician: Angelica Santos  Type of Anesthesia Anticipated: General    Patient has a Health Care Directive or Living Will:  NO    1. NO - Do you have a history of heart attack, stroke, stent, bypass or surgery on an artery in the head, neck, heart or legs?  2. NO - Do you ever have any pain or discomfort in your chest?  3. NO - Do you have a history of  Heart Failure?  4. sometimes- Are you troubled by shortness of breath when: walking on the level, up a slight hill or at night?  5. NO - Do you currently have a cold, bronchitis or other respiratory infection?  6. NO - Do you have a cough, shortness of breath or wheezing?  7. NO - Do you sometimes get pains in the calves of your legs when you walk?  8. NO - Do you or anyone in your family have previous history of blood clots?  9. NO - Do you or does anyone in your family have a serious bleeding problem such as prolonged bleeding following surgeries or cuts?  10. NO - Have you ever had problems with anemia or been told to take iron pills?  11. NO - Have you had any abnormal blood loss such as black, tarry or bloody stools, or abnormal vaginal bleeding?  12. NO - Have you ever had a blood transfusion?  13. NO - Have you or any of your relatives ever had problems with anesthesia?  14. NO - Do you have sleep apnea, excessive snoring or  daytime drowsiness?  15. NO - Do you have any prosthetic heart valves?  16. NO - Do you have prosthetic joints?  17. NO - Is there any chance that you may be pregnant?      HPI:     HPI related to upcoming procedure: hx of abdominal pain-hx of gall stones. Pain worsens with eating.     Uses nasal spray when she has a cold.     SOB: happens about 3-4x/day for a few seconds, for the past 3-4 weeks. Not related to movement-could be happening when she is just sitting down. She sits at desk at work. She denies related to anxiety.  She describes it as 'catching her breath quickly' then it seems to go away. Does not smoke. No long travel. Did have a cold in the past month-may have started during her cold. No wheezing or chest pain.         MEDICAL HISTORY:     Patient Active Problem List    Diagnosis Date Noted     History of gallstones 03/20/2018     Priority: Medium     Chronic migraine without aura with status migrainosus, not intractable 04/26/2016     Priority: Medium     IUD (intrauterine device) in place 03/20/2015     Priority: Medium     3/20/15 Mirena IUD placed without difficulty.       Elevated BP 12/30/2014     Priority: Medium     Hemoglobin C (Hb-C)   Alpha Thalessemia  07/22/2014     Priority: Medium     Hgb A1:  62.9   Hgb C:  33.2%.    may represent Hb C/alpha thalassemia.In Hb C/alpha thalassemia, Hb C percentage is lower than the usual percent seen in Hb C trait with the presence of microcytosis.          BMI 30.0-30.9,adult 07/18/2014     Priority: Medium     7/18/14:  A1C:  5.4   Glucose:  84 mg/dl      GCT @ 24 wks= 123 mg/dL  3/20/15 postpartum visit;  Body mass index is 32.38 kg/(m^2).          Past Medical History:   Diagnosis Date     NO ACTIVE PROBLEMS (aka NONE)      Past Surgical History:   Procedure Laterality Date     DILATION AND CURETTAGE SUCTION  2003/08/10    x 3     Current Outpatient Prescriptions   Medication Sig Dispense Refill     albuterol (PROAIR HFA/PROVENTIL HFA/VENTOLIN HFA)  "108 (90 Base) MCG/ACT Inhaler Inhale 2 puffs into the lungs every 6 hours as needed for shortness of breath / dyspnea or wheezing 1 Inhaler 1     levonorgestrel (MIRENA) 20 MCG/24HR IUD 1 each (20 mcg) by Intrauterine route once for 1 dose 1 each 0     OTC products: None, except as noted above    No Known Allergies   Latex Allergy: NO    Social History   Substance Use Topics     Smoking status: Never Smoker     Smokeless tobacco: Never Used     Alcohol use Yes      Comment: 1-11 a year     History   Drug Use No       REVIEW OF SYSTEMS:   CONSTITUTIONAL: NEGATIVE for fever, chills, change in weight  INTEGUMENTARY/SKIN: NEGATIVE for worrisome rashes, moles or lesions  EYES: NEGATIVE for vision changes or irritation  ENT/MOUTH: NEGATIVE for ear, mouth and throat problems  RESP: POSITIVE for new intermittant SOB  BREAST: NEGATIVE for masses, tenderness or discharge  CV: NEGATIVE for chest pain, palpitations or peripheral edema  GI: positive for nausea, abdominal pain, negative for heartburn, or change in bowel habits  : NEGATIVE for frequency, dysuria, or hematuria  MUSCULOSKELETAL: NEGATIVE for significant arthralgias or myalgia  NEURO: NEGATIVE for weakness, dizziness or paresthesias  ENDOCRINE: NEGATIVE for temperature intolerance, skin/hair changes  HEME: NEGATIVE for bleeding problems  PSYCHIATRIC: NEGATIVE for changes in mood or affect    EXAM:   /84 (BP Location: Right arm, Patient Position: Sitting, Cuff Size: Adult Large)  Pulse 102  Temp 98.8  F (37.1  C) (Oral)  Resp 20  Ht 1.74 m (5' 8.5\")  Wt 122.7 kg (270 lb 6.4 oz)  SpO2 97%  BMI 40.52 kg/m2    GENERAL APPEARANCE: healthy, alert and no distress     EYES: EOMI, PERRL     HENT: ear canals and TM's normal and nose and mouth without ulcers or lesions     NECK: no adenopathy, no asymmetry, masses, or scars and thyroid -MICHAEL due to obesity     RESP: lungs clear to auscultation - no rales, rhonchi or wheezes. No cough, no SOB currently. sats and RR " WNL     CV: regular rates and rhythm, normal S1 S2, no S3 or S4 and no murmur, click or rub     ABDOMEN:  Soft, obese, nontender, MICHAEL HSM  Due to obesity, and bowel sounds normal     MS: extremities normal- no gross deformities noted, no evidence of inflammation in joints, FROM in all extremities.     SKIN: no suspicious lesions or rashes     NEURO: Normal strength and tone, sensory exam grossly normal, mentation intact and speech normal     PSYCH: mentation appears normal. and affect normal/bright     LYMPHATICS: No cervical adenopathy    DIAGNOSTICS:   EKG: appears normal, NSR, normal axis, normal intervals, no acute ST/T changes c/w ischemia, no LVH by voltage criteria    Recent Labs   Lab Test  08/09/16   1259  05/06/16   1204  04/26/16   1202   07/18/14   1215   HGB  12.6  12.9  12.4   < >  11.4*   PLT  247  241  221   < >  236   NA   --    --   138   --    --    POTASSIUM   --    --   3.5   --    --    CR   --    --   0.85   --    --    A1C   --    --    --    --   5.4    < > = values in this interval not displayed.    labs pending    IMPRESSION:   Reason for surgery/procedure: gall stones  Diagnosis/reason for consult: gall stone removal    The proposed surgical procedure is considered LOW risk.    REVISED CARDIAC RISK INDEX  The patient has the following serious cardiovascular risks for perioperative complications such as (MI, PE, VFib and 3  AV Block):  No serious cardiac risks  INTERPRETATION: 0 risks: Class I (very low risk - 0.4% complication rate)    The patient has the following additional risks for perioperative complications:  No identified additional risks      ICD-10-CM    1. Preop general physical exam Z01.818 EKG 12-lead complete w/read - Clinics     CBC with platelets     Basic metabolic panel     Basic metabolic panel   2. History of gallstones Z87.19    3. SOB (shortness of breath) R06.02 XR Chest 2 Views     TSH with free T4 reflex     albuterol (PROAIR HFA/PROVENTIL HFA/VENTOLIN HFA) 108 (90  Base) MCG/ACT Inhaler   4. Morbid obesity (H) E66.01        RECOMMENDATIONS:     --Consult hospital rounder / IM to assist post-op medical management.    -- Patient is morbidly obese, risk of hypoventilation syndrome r/t obesity post sedation. Also having some SOB intermittently since having a cold a month ago-she describes it more as 'catching her breath' than on-going SOB. Chest x-ray and EKG normal and sats normal, no chest pain. Likely due to her cold. She will let provider know if it is worsening or not improving.  She has albuterol prn.    --Patient is to take all scheduled medications on the day of surgery EXCEPT for modifications listed below.    APPROVAL GIVEN to proceed with proposed procedure, without further diagnostic evaluation at this time.        Signed Electronically by: KAREY Pitt, NP-C    Copy of this evaluation report is provided to requesting physician.    [facility] Preop Guidelines    Revised Cardiac Risk Index

## 2018-04-19 LAB
ANION GAP SERPL CALCULATED.3IONS-SCNC: 8 MMOL/L (ref 3–14)
BUN SERPL-MCNC: 11 MG/DL (ref 7–30)
CALCIUM SERPL-MCNC: 8.8 MG/DL (ref 8.5–10.1)
CHLORIDE SERPL-SCNC: 107 MMOL/L (ref 94–109)
CO2 SERPL-SCNC: 25 MMOL/L (ref 20–32)
CREAT SERPL-MCNC: 0.69 MG/DL (ref 0.52–1.04)
GFR SERPL CREATININE-BSD FRML MDRD: >90 ML/MIN/1.7M2
GLUCOSE SERPL-MCNC: 90 MG/DL (ref 70–99)
POTASSIUM SERPL-SCNC: 3.6 MMOL/L (ref 3.4–5.3)
SODIUM SERPL-SCNC: 140 MMOL/L (ref 133–144)

## 2018-05-04 ENCOUNTER — ANESTHESIA EVENT (OUTPATIENT)
Dept: SURGERY | Facility: AMBULATORY SURGERY CENTER | Age: 43
End: 2018-05-04

## 2018-05-07 ENCOUNTER — HOSPITAL ENCOUNTER (OUTPATIENT)
Facility: AMBULATORY SURGERY CENTER | Age: 43
Discharge: HOME | End: 2018-05-07
Attending: SURGERY | Admitting: SURGERY
Payer: COMMERCIAL

## 2018-05-07 ENCOUNTER — ANESTHESIA (OUTPATIENT)
Dept: SURGERY | Facility: AMBULATORY SURGERY CENTER | Age: 43
End: 2018-05-07
Payer: COMMERCIAL

## 2018-05-07 ENCOUNTER — SURGERY (OUTPATIENT)
Age: 43
End: 2018-05-07
Payer: COMMERCIAL

## 2018-05-07 VITALS
DIASTOLIC BLOOD PRESSURE: 103 MMHG | TEMPERATURE: 96.8 F | OXYGEN SATURATION: 94 % | RESPIRATION RATE: 20 BRPM | SYSTOLIC BLOOD PRESSURE: 131 MMHG

## 2018-05-07 DIAGNOSIS — K80.20 SYMPTOMATIC CHOLELITHIASIS: Primary | ICD-10-CM

## 2018-05-07 LAB — BETA HCG QUAL IFA URINE: NEGATIVE

## 2018-05-07 PROCEDURE — 88304 TISSUE EXAM BY PATHOLOGIST: CPT | Performed by: SURGERY

## 2018-05-07 PROCEDURE — 84703 CHORIONIC GONADOTROPIN ASSAY: CPT | Performed by: ANESTHESIOLOGY

## 2018-05-07 PROCEDURE — 47562 LAPAROSCOPIC CHOLECYSTECTOMY: CPT

## 2018-05-07 PROCEDURE — 47562 LAPAROSCOPIC CHOLECYSTECTOMY: CPT | Performed by: SURGERY

## 2018-05-07 PROCEDURE — G8916 PT W IV AB GIVEN ON TIME: HCPCS

## 2018-05-07 PROCEDURE — G8907 PT DOC NO EVENTS ON DISCHARG: HCPCS

## 2018-05-07 RX ORDER — LIDOCAINE 40 MG/G
CREAM TOPICAL
Status: DISCONTINUED | OUTPATIENT
Start: 2018-05-07 | End: 2018-05-08 | Stop reason: HOSPADM

## 2018-05-07 RX ORDER — HEPARIN SODIUM 5000 [USP'U]/.5ML
5000 INJECTION, SOLUTION INTRAVENOUS; SUBCUTANEOUS
Status: COMPLETED | OUTPATIENT
Start: 2018-05-07 | End: 2018-05-07

## 2018-05-07 RX ORDER — PROPOFOL 10 MG/ML
INJECTION, EMULSION INTRAVENOUS CONTINUOUS PRN
Status: DISCONTINUED | OUTPATIENT
Start: 2018-05-07 | End: 2018-05-07

## 2018-05-07 RX ORDER — MELOXICAM 15 MG/1
15 TABLET ORAL
Status: COMPLETED | OUTPATIENT
Start: 2018-05-07 | End: 2018-05-07

## 2018-05-07 RX ORDER — NALOXONE HYDROCHLORIDE 0.4 MG/ML
.1-.4 INJECTION, SOLUTION INTRAMUSCULAR; INTRAVENOUS; SUBCUTANEOUS
Status: DISCONTINUED | OUTPATIENT
Start: 2018-05-07 | End: 2018-05-08 | Stop reason: HOSPADM

## 2018-05-07 RX ORDER — CEFAZOLIN SODIUM 1 G/50ML
3 SOLUTION INTRAVENOUS
Status: COMPLETED | OUTPATIENT
Start: 2018-05-07 | End: 2018-05-07

## 2018-05-07 RX ORDER — ONDANSETRON 2 MG/ML
4 INJECTION INTRAMUSCULAR; INTRAVENOUS EVERY 30 MIN PRN
Status: DISCONTINUED | OUTPATIENT
Start: 2018-05-07 | End: 2018-05-08 | Stop reason: HOSPADM

## 2018-05-07 RX ORDER — ONDANSETRON 2 MG/ML
INJECTION INTRAMUSCULAR; INTRAVENOUS PRN
Status: DISCONTINUED | OUTPATIENT
Start: 2018-05-07 | End: 2018-05-07

## 2018-05-07 RX ORDER — MELOXICAM 15 MG/1
15 TABLET ORAL DAILY
Qty: 5 TABLET | Refills: 1 | Status: SHIPPED | OUTPATIENT
Start: 2018-05-07 | End: 2018-10-31

## 2018-05-07 RX ORDER — ACETAMINOPHEN 325 MG/1
975 TABLET ORAL ONCE
Status: COMPLETED | OUTPATIENT
Start: 2018-05-07 | End: 2018-05-07

## 2018-05-07 RX ORDER — GLYCOPYRROLATE 0.2 MG/ML
INJECTION, SOLUTION INTRAMUSCULAR; INTRAVENOUS PRN
Status: DISCONTINUED | OUTPATIENT
Start: 2018-05-07 | End: 2018-05-07

## 2018-05-07 RX ORDER — MEPERIDINE HYDROCHLORIDE 25 MG/ML
12.5 INJECTION INTRAMUSCULAR; INTRAVENOUS; SUBCUTANEOUS
Status: DISCONTINUED | OUTPATIENT
Start: 2018-05-07 | End: 2018-05-08 | Stop reason: HOSPADM

## 2018-05-07 RX ORDER — PROPOFOL 10 MG/ML
INJECTION, EMULSION INTRAVENOUS PRN
Status: DISCONTINUED | OUTPATIENT
Start: 2018-05-07 | End: 2018-05-07

## 2018-05-07 RX ORDER — OXYCODONE HYDROCHLORIDE 5 MG/1
5 TABLET ORAL
Status: COMPLETED | OUTPATIENT
Start: 2018-05-07 | End: 2018-05-07

## 2018-05-07 RX ORDER — FENTANYL CITRATE 50 UG/ML
25-50 INJECTION, SOLUTION INTRAMUSCULAR; INTRAVENOUS
Status: DISCONTINUED | OUTPATIENT
Start: 2018-05-07 | End: 2018-05-08 | Stop reason: HOSPADM

## 2018-05-07 RX ORDER — BUPIVACAINE HYDROCHLORIDE AND EPINEPHRINE 2.5; 5 MG/ML; UG/ML
INJECTION, SOLUTION INFILTRATION; PERINEURAL PRN
Status: DISCONTINUED | OUTPATIENT
Start: 2018-05-07 | End: 2018-05-07 | Stop reason: HOSPADM

## 2018-05-07 RX ORDER — FENTANYL CITRATE 50 UG/ML
INJECTION, SOLUTION INTRAMUSCULAR; INTRAVENOUS PRN
Status: DISCONTINUED | OUTPATIENT
Start: 2018-05-07 | End: 2018-05-07

## 2018-05-07 RX ORDER — CEFAZOLIN SODIUM 1 G/3ML
1 INJECTION, POWDER, FOR SOLUTION INTRAMUSCULAR; INTRAVENOUS SEE ADMIN INSTRUCTIONS
Status: DISCONTINUED | OUTPATIENT
Start: 2018-05-07 | End: 2018-05-08 | Stop reason: HOSPADM

## 2018-05-07 RX ORDER — ONDANSETRON 4 MG/1
4 TABLET, ORALLY DISINTEGRATING ORAL EVERY 30 MIN PRN
Status: DISCONTINUED | OUTPATIENT
Start: 2018-05-07 | End: 2018-05-08 | Stop reason: HOSPADM

## 2018-05-07 RX ORDER — OXYCODONE AND ACETAMINOPHEN 5; 325 MG/1; MG/1
1-2 TABLET ORAL EVERY 4 HOURS PRN
Qty: 30 TABLET | Refills: 0 | Status: SHIPPED | OUTPATIENT
Start: 2018-05-07 | End: 2018-10-31

## 2018-05-07 RX ORDER — NEOSTIGMINE METHYLSULFATE 1 MG/ML
VIAL (ML) INJECTION PRN
Status: DISCONTINUED | OUTPATIENT
Start: 2018-05-07 | End: 2018-05-07

## 2018-05-07 RX ORDER — HYDROMORPHONE HYDROCHLORIDE 1 MG/ML
.3-.5 INJECTION, SOLUTION INTRAMUSCULAR; INTRAVENOUS; SUBCUTANEOUS EVERY 10 MIN PRN
Status: DISCONTINUED | OUTPATIENT
Start: 2018-05-07 | End: 2018-05-08 | Stop reason: HOSPADM

## 2018-05-07 RX ORDER — SODIUM CHLORIDE, SODIUM LACTATE, POTASSIUM CHLORIDE, CALCIUM CHLORIDE 600; 310; 30; 20 MG/100ML; MG/100ML; MG/100ML; MG/100ML
INJECTION, SOLUTION INTRAVENOUS CONTINUOUS
Status: DISCONTINUED | OUTPATIENT
Start: 2018-05-07 | End: 2018-05-08 | Stop reason: HOSPADM

## 2018-05-07 RX ORDER — GABAPENTIN 300 MG/1
300 CAPSULE ORAL ONCE
Status: COMPLETED | OUTPATIENT
Start: 2018-05-07 | End: 2018-05-07

## 2018-05-07 RX ORDER — LIDOCAINE HYDROCHLORIDE 20 MG/ML
INJECTION, SOLUTION INFILTRATION; PERINEURAL PRN
Status: DISCONTINUED | OUTPATIENT
Start: 2018-05-07 | End: 2018-05-07

## 2018-05-07 RX ORDER — DEXAMETHASONE SODIUM PHOSPHATE 4 MG/ML
INJECTION, SOLUTION INTRA-ARTICULAR; INTRALESIONAL; INTRAMUSCULAR; INTRAVENOUS; SOFT TISSUE PRN
Status: DISCONTINUED | OUTPATIENT
Start: 2018-05-07 | End: 2018-05-07

## 2018-05-07 RX ADMIN — FENTANYL CITRATE 50 MCG: 50 INJECTION, SOLUTION INTRAMUSCULAR; INTRAVENOUS at 09:33

## 2018-05-07 RX ADMIN — FENTANYL CITRATE 25 MCG: 50 INJECTION, SOLUTION INTRAMUSCULAR; INTRAVENOUS at 11:27

## 2018-05-07 RX ADMIN — OXYCODONE HYDROCHLORIDE 5 MG: 5 TABLET ORAL at 11:20

## 2018-05-07 RX ADMIN — LIDOCAINE HYDROCHLORIDE 40 MG: 20 INJECTION, SOLUTION INFILTRATION; PERINEURAL at 09:33

## 2018-05-07 RX ADMIN — ACETAMINOPHEN 975 MG: 325 TABLET ORAL at 08:43

## 2018-05-07 RX ADMIN — GABAPENTIN 300 MG: 300 CAPSULE ORAL at 08:43

## 2018-05-07 RX ADMIN — GLYCOPYRROLATE 0.8 MG: 0.2 INJECTION, SOLUTION INTRAMUSCULAR; INTRAVENOUS at 10:28

## 2018-05-07 RX ADMIN — DEXAMETHASONE SODIUM PHOSPHATE 10 MG: 4 INJECTION, SOLUTION INTRA-ARTICULAR; INTRALESIONAL; INTRAMUSCULAR; INTRAVENOUS; SOFT TISSUE at 09:55

## 2018-05-07 RX ADMIN — Medication 5 MG: at 10:28

## 2018-05-07 RX ADMIN — SODIUM CHLORIDE, SODIUM LACTATE, POTASSIUM CHLORIDE, CALCIUM CHLORIDE: 600; 310; 30; 20 INJECTION, SOLUTION INTRAVENOUS at 08:57

## 2018-05-07 RX ADMIN — PROPOFOL 200 MG: 10 INJECTION, EMULSION INTRAVENOUS at 09:33

## 2018-05-07 RX ADMIN — ONDANSETRON 4 MG: 2 INJECTION INTRAMUSCULAR; INTRAVENOUS at 09:56

## 2018-05-07 RX ADMIN — CEFAZOLIN SODIUM 3 G: 1 SOLUTION INTRAVENOUS at 09:25

## 2018-05-07 RX ADMIN — GLYCOPYRROLATE 0.2 MG: 0.2 INJECTION, SOLUTION INTRAMUSCULAR; INTRAVENOUS at 09:33

## 2018-05-07 RX ADMIN — PROPOFOL 200 MCG/KG/MIN: 10 INJECTION, EMULSION INTRAVENOUS at 09:35

## 2018-05-07 RX ADMIN — MELOXICAM 15 MG: 15 TABLET ORAL at 09:23

## 2018-05-07 RX ADMIN — BUPIVACAINE HYDROCHLORIDE AND EPINEPHRINE 10 ML: 2.5; 5 INJECTION, SOLUTION INFILTRATION; PERINEURAL at 10:17

## 2018-05-07 RX ADMIN — Medication 30 MG: at 09:41

## 2018-05-07 RX ADMIN — FENTANYL CITRATE 50 MCG: 50 INJECTION, SOLUTION INTRAMUSCULAR; INTRAVENOUS at 09:43

## 2018-05-07 RX ADMIN — HEPARIN SODIUM 5000 UNITS: 5000 INJECTION, SOLUTION INTRAVENOUS; SUBCUTANEOUS at 08:45

## 2018-05-07 NOTE — OP NOTE
Preoperative Diagnosis: Symptomatic cholelithiasis    Postoperative Diagnosis: Symptomatic cholelithiasis    Procedure: Laparoscopic cholecystectomy    Attending Surgeon: Alicia Medina MD    Anesthesiologist: Rickie Arreguin MD    Preoperative Note: This is a 42 year old female with symptoms consistent with biliary colic and cholelithiasis was demonstrated on US. At time of surgery, there were a small contracted gallbladder noted, without any significant adhesions.    Operative Note:   Once the patient was induced under general anesthesia and intubated, the abdomen was prepped and draped in the usual fashion. A time out was performed. All trocar site were pre anesthetized with marcaine with epinephrine. Entry was gained infra umbilically in an open fashion. There was no evidence of entry injury. Pneumoperitoneum was achieved up to a pressure of 15 mm Hg. Three 5 mm trocars were inserted under direct visualization in the right upper quadrant. The gallbladder was grasped at the fundus and retracted. It was also grasped at Rosa's pouch and dissection was begun in the triangle of Calot. The cystic duct and cystic artery were both isolated. The critical view was achieved. Both structures were transected after placing clips proximally and distally. The gallbladder was taken off the liver bed using the hook and electrocautery. Just prior to completely detaching the gallbladder, the clips were examined and noted to be secure and in good position. The gallbladder was then removed through the umbilical incision in a bag. Examination of the liver bed did not identify any accumulation of bile or blood. The ports were then removed under direct visualization. The fascia at the umbilical incision was closed with 0 vicryl. Skin was closed with 4-0 vicryl subcuticular. Steristrips were applied and a dry dressing. The patient was lightened from anesthesia, extubated and taken to recover room in stable condition. Sponge,  instrument and needle counts were correct at the end of the case. Estimated blood loss was minimal.

## 2018-05-07 NOTE — IP AVS SNAPSHOT
MRN:[id number]                      After Visit Summary   5/7/2018    Minerva Andrade    MRN: [id number]           Thank you!     Thank you for choosing [facility] for your care. Our goal is always to provide you with excellent care. Hearing back from our patients is one way we can continue to improve our services. Please take a few minutes to complete the written survey that you may receive in the mail after you visit with us. Thank you!        Patient Information     Date Of Birth          1975        About your hospital stay     You were admitted on:  May 7, 2018 You last received care in the:  [facility]    You were discharged on:  May 7, 2018       Who to Call     For medical emergencies, please call 911.  For non-urgent questions about your medical care, please call your primary care provider or clinic, [phone]  For questions related to your surgery, please call your surgery clinic        Attending Provider     Provider Specialty    Alicia Medina MD Surgery       Primary Care Provider Office Phone # Fax #    Angelica LopezRAMONA reilly [phone]      After Care Instructions     Diet Instructions       Resume pre-procedure diet            Discharge Instructions       Patient to follow up with appointment in 4 weeks            Do not - immerse incision in water until sutures removed       Do not immerse incision in water for 1 week            Dressing       Remove dressing in 2 days, remove steristrips as they roll off ~ 2 weeks.            No Alcohol       For 24 hours post procedure            No driving or operating machinery        until the day after procedure            Shower       May shower            Weight bearing status - As tolerated                 Your next 10 appointments already scheduled     Jun 04, 2018  3:00 PM CDT   [facility]    [address] [place] [address]  N  [facility] [address]   [phone]              Further instructions from your care team       [facility]  Same-Day Surgery   Adult Discharge Orders & Instructions   For 24 hours after surgery  1. Get plenty of rest.  A responsible adult must stay with you for at least 24 hours after you leave the hospital.   2. Do not drive or use heavy equipment.  If you have weakness or tingling, don't drive or use heavy equipment until this feeling goes away.  3. Do not drink alcohol.  4. Avoid strenuous or risky activities.  Ask for help when climbing stairs.   5. You may feel lightheaded.  IF so, sit for a few minutes before standing.  Have someone help you get up.   6. If you have nausea (feel sick to your stomach): Drink only clear liquids such as apple juice, ginger ale, broth or 7-Up.  Rest may also help.  Be sure to drink enough fluids.  Move to a regular diet as you feel able.  7. You may have a slight fever. Call the doctor if your fever is over 100 F (37.7 C) (taken under the tongue) or lasts longer than 24 hours.  8. You may have a dry mouth, a sore throat, muscle aches or trouble sleeping.  These should go away after 24 hours.  9. Do not make important or legal decisions.   Call your doctor for any of the followin.  Signs of infection (fever, growing tenderness at the surgery site, a large amount of drainage or bleeding, severe pain, foul-smelling drainage, redness, swelling).    2. It has been over 8 to 10 hours since surgery and you are still not able to urinate (pass water).    3.  Headache for over 24 hours.    4.  Numbness, tingling or weakness the day after surgery (if you had spinal anesthesia).        Pending Results     Date and Time Order Name Status Description    2018 1013 Surgical pathology exam In process             Admission Information     Date & Time Provider Department Dept. Phone    2018 Alicia Medina MD [facility] [phone]       Your Vitals Were     Blood Pressure Temperature Respirations Pulse Oximetry          135/74 96.6  F (35.9  C) (Temporal) 12 90%        [employer] Information     [employer] gives you secure access to your electronic health record. If you see a primary care provider, you can also send messages to your care team and make appointments. If you have questions, please call your primary care clinic.  If you do not have a primary care provider, please call [phone] and they will assist you.        Care EveryWhere ID     This is your Care EveryWhere ID. This could be used by other organizations to access your [facility] medical records  [id number]        Equal Access to Services     [facility] : Laura Mcallister, sania sheets, nolan gutierrez, emmanuel nance . So [facility] [phone].    ATENCIÓN: Si habla español, tiene a pierson disposición servicios gratuitos de asistencia lingüística. Llame al [phone].    We comply with applicable federal civil rights laws and [place] laws. We do not discriminate on the basis of race, color, national origin, age, disability, sex, sexual orientation, or gender identity.               Review of your medicines      START taking        Dose / Directions    meloxicam 15 MG tablet   Commonly known as:  MOBIC   Used for:  Symptomatic cholelithiasis        Dose:  15 mg   Take 1 tablet (15 mg) by mouth daily   Quantity:  5 tablet   Refills:  1       oxyCODONE-acetaminophen 5-325 MG per tablet   Commonly known as:  PERCOCET   Used for:  Symptomatic cholelithiasis        Dose:  1-2 tablet   Take 1-2 tablets by mouth every 4 hours as needed for pain (moderate to severe)   Quantity:  30 tablet   Refills:  0         CONTINUE these medicines which have NOT CHANGED        Dose / Directions    albuterol 108 (90 Base) MCG/ACT Inhaler   Commonly known as:  PROAIR HFA/PROVENTIL HFA/VENTOLIN HFA   Used for:  SOB (shortness of breath)        Dose:  2  puff   Inhale 2 puffs into the lungs every 6 hours as needed for shortness of breath / dyspnea or wheezing   Quantity:  1 Inhaler   Refills:  1       levonorgestrel 20 MCG/24HR IUD   Commonly known as:  MIRENA   Used for:  Encounter for IUD insertion, Pregnancy, status unknown        Dose:  1 each   1 each (20 mcg) by Intrauterine route once for 1 dose   Quantity:  1 each   Refills:  0            Where to get your medicines      Some of these will need a paper prescription and others can be bought over the counter. Ask your nurse if you have questions.     Bring a paper prescription for each of these medications     meloxicam 15 MG tablet    oxyCODONE-acetaminophen 5-325 MG per tablet                Protect others around you: Learn how to safely use, store and throw away your medicines at www.disposemymeds.org.        Information about OPIOIDS     PRESCRIPTION OPIOIDS: WHAT YOU NEED TO KNOW   You have a prescription for an opioid (narcotic) pain medicine. Opioids can cause addiction. If you have a history of chemical dependency of any type, you are at a higher risk of becoming addicted to opioids. Only take this medicine after all other options have been tried. Take it for as short a time and as few doses as possible.     Do not:    Drive. If you drive while taking these medicines, you could be arrested for driving under the influence (DUI).    Operate heavy machinery    Do any other dangerous activities while taking these medicines.     Drink any alcohol while taking these medicines.      Take with any other medicines that contain acetaminophen. Read all labels carefully. Look for the word  acetaminophen  or  Tylenol.  Ask your pharmacist if you have questions or are unsure.    Store your pills in a secure place, locked if possible. We will not replace any lost or stolen medicine. If you don t finish your medicine, please throw away (dispose) as directed by your pharmacist. The [place] Pollution [employer]  has more information about safe disposal: https://www.pca.[facility].us/living-green/managing-unwanted-medications    All opioids tend to cause constipation. Drink plenty of water and eat foods that have a lot of fiber, such as fruits, vegetables, prune juice, apple juice and high-fiber cereal. Take a laxative (Miralax, milk of magnesia, Colace, Senna) if you don t move your bowels at least every other day.              Medication List: This is a list of all your medications and when to take them. Check marks below indicate your daily home schedule. Keep this list as a reference.      Medications           Morning Afternoon Evening Bedtime As Needed    albuterol 108 (90 Base) MCG/ACT Inhaler   Commonly known as:  PROAIR HFA/PROVENTIL HFA/VENTOLIN HFA   Inhale 2 puffs into the lungs every 6 hours as needed for shortness of breath / dyspnea or wheezing                                levonorgestrel 20 MCG/24HR IUD   Commonly known as:  MIRENA   1 each (20 mcg) by Intrauterine route once for 1 dose                                meloxicam 15 MG tablet   Commonly known as:  MOBIC   Take 1 tablet (15 mg) by mouth daily   Last time this was given:  15 mg on 5/7/2018  9:23 AM                                oxyCODONE-acetaminophen 5-325 MG per tablet   Commonly known as:  PERCOCET   Take 1-2 tablets by mouth every 4 hours as needed for pain (moderate to severe)

## 2018-05-07 NOTE — ANESTHESIA POSTPROCEDURE EVALUATION
Patient: Minerva Andrade    Procedure(s):  Laparoscopic cholecystectomy - Wound Class: II-Clean Contaminated    Diagnosis:Symptomatic cholelithiasis  Diagnosis Additional Information: No value filed.    Anesthesia Type:  General, ETT, RSI    Note:  Anesthesia Post Evaluation    Patient location during evaluation: PACU  Patient participation: Able to fully participate in evaluation  Level of consciousness: awake  Pain management: adequate  Airway patency: patent  Cardiovascular status: acceptable  Respiratory status: acceptable  Hydration status: acceptable  PONV: none     Anesthetic complications: None    Comments: Stable recovery.  Breathing well.        Last vitals:  Vitals:    05/07/18 1120 05/07/18 1125 05/07/18 1140   BP: 138/84 138/84 146/89   Resp: 22 23 16   Temp:      SpO2: 97% 95% 96%         Electronically Signed By: Rickie Arreguin MD  May 7, 2018  11:48 AM

## 2018-05-07 NOTE — INTERVAL H&P NOTE
Preop Dx: Symptomatic cholelithiasis  Planned procedure: Laparoscopic cholecystectomy.  Patient seen and examined.   No changes to previously documented H&P.

## 2018-05-07 NOTE — ANESTHESIA CARE TRANSFER NOTE
Patient: Minerva Andrade    Procedure(s):  Laparoscopic cholecystectomy - Wound Class: II-Clean Contaminated    Diagnosis: Symptomatic cholelithiasis  Diagnosis Additional Information: No value filed.    Anesthesia Type:   General, ETT, RSI     Note:  Airway :Face Mask  Patient transferred to:PACU  Comments: To PACU after suctioned and extubated awake.  Report to RN. VSS, Respiratory status stable.Handoff Report: Identifed the Patient, Identified the Reponsible Provider, Reviewed the pertinent medical history, Discussed the surgical course, Reviewed Intra-OP anesthesia mangement and issues during anesthesia, Set expectations for post-procedure period and Allowed opportunity for questions and acknowledgement of understanding      Vitals: (Last set prior to Anesthesia Care Transfer)    CRNA VITALS  5/7/2018 1016 - 5/7/2018 1050      5/7/2018             Pulse: 107    SpO2: 99 %    Resp Rate (observed): 25                Electronically Signed By: KAREY Jackson CRNA  May 7, 2018  10:50 AM

## 2018-05-07 NOTE — ANESTHESIA PREPROCEDURE EVALUATION
Anesthesia Evaluation     . Pt has had prior anesthetic. Type: MAC    No history of anesthetic complications          ROS/MED HX    ENT/Pulmonary: Comment: Shortness of breath after URI in winter.  Resolved without treatment      Neurologic:     (+)migraines,     Cardiovascular:  - neg cardiovascular ROS       METS/Exercise Tolerance:     Hematologic:  - neg hematologic  ROS       Musculoskeletal:  - neg musculoskeletal ROS       GI/Hepatic:  - neg GI/hepatic ROS       Renal/Genitourinary:  - ROS Renal section negative       Endo:     (+) Obesity, .      Psychiatric:  - neg psychiatric ROS       Infectious Disease:  - neg infectious disease ROS       Malignancy:      - no malignancy   Other:    (+) No chance of pregnancy   - neg other ROS                 Physical Exam  Normal systems: cardiovascular, pulmonary and dental    Airway   Mallampati: II  TM distance: >3 FB  Neck ROM: full    Dental     Cardiovascular       Pulmonary                     Anesthesia Plan      History & Physical Review  History and physical reviewed and following examination; no interval change.    ASA Status:  3 .    NPO Status:  > 8 hours    Plan for General, ETT and RSI with Intravenous and Propofol induction. Maintenance will be TIVA.    PONV prophylaxis:  Ondansetron (or other 5HT-3) and Dexamethasone or Solumedrol  Additional equipment: Videolaryngoscope Videolaryngoscope a available although probably will not be needed.      Postoperative Care  Postoperative pain management:  Multi-modal analgesia.      Consents  Anesthetic plan, risks, benefits and alternatives discussed with:  Patient..                          .

## 2018-05-07 NOTE — H&P (VIEW-ONLY)
[address] [address]  [phone]  Dept: [phone]    PRE-OP EVALUATION:  Today's date: 2018    Minerva Andrade (: 1975) presents for pre-operative evaluation assessment as requested by Dr. Medina.  She requires evaluation and anesthesia risk assessment prior to undergoing surgery/procedure for treatment of Gall bladder Removal .    Proposed Surgery/ Procedure: Gall bladder Removal  Date of Surgery/ Procedure: 18  Time of Surgery/ Procedure: 12:00pm  Hospital/Surgical Facility: [facility]  Fax number for surgical facility: [phone]  Primary Physician: Angelica Santos  Type of Anesthesia Anticipated: General    Patient has a Health Care Directive or Living Will:  NO    1. NO - Do you have a history of heart attack, stroke, stent, bypass or surgery on an artery in the head, neck, heart or legs?  2. NO - Do you ever have any pain or discomfort in your chest?  3. NO - Do you have a history of  Heart Failure?  4. sometimes- Are you troubled by shortness of breath when: walking on the level, up a slight hill or at night?  5. NO - Do you currently have a cold, bronchitis or other respiratory infection?  6. NO - Do you have a cough, shortness of breath or wheezing?  7. NO - Do you sometimes get pains in the calves of your legs when you walk?  8. NO - Do you or anyone in your family have previous history of blood clots?  9. NO - Do you or does anyone in your family have a serious bleeding problem such as prolonged bleeding following surgeries or cuts?  10. NO - Have you ever had problems with anemia or been told to take iron pills?  11. NO - Have you had any abnormal blood loss such as black, tarry or bloody stools, or abnormal vaginal bleeding?  12. NO - Have you ever had a blood transfusion?  13. NO - Have you or any of your relatives ever had problems with anesthesia?  14. NO - Do you have sleep apnea, excessive snoring or  daytime drowsiness?  15. NO - Do you have any prosthetic heart valves?  16. NO - Do you have prosthetic joints?  17. NO - Is there any chance that you may be pregnant?      HPI:     HPI related to upcoming procedure: hx of abdominal pain-hx of gall stones. Pain worsens with eating.     Uses nasal spray when she has a cold.     SOB: happens about 3-4x/day for a few seconds, for the past 3-4 weeks. Not related to movement-could be happening when she is just sitting down. She sits at desk at work. She denies related to anxiety.  She describes it as 'catching her breath quickly' then it seems to go away. Does not smoke. No long travel. Did have a cold in the past month-may have started during her cold. No wheezing or chest pain.         MEDICAL HISTORY:     Patient Active Problem List    Diagnosis Date Noted     History of gallstones 03/20/2018     Priority: Medium     Chronic migraine without aura with status migrainosus, not intractable 04/26/2016     Priority: Medium     IUD (intrauterine device) in place 03/20/2015     Priority: Medium     3/20/15 Mirena IUD placed without difficulty.       Elevated BP 12/30/2014     Priority: Medium     Hemoglobin C (Hb-C)   Alpha Thalessemia  07/22/2014     Priority: Medium     Hgb A1:  62.9   Hgb C:  33.2%.    may represent Hb C/alpha thalassemia.In Hb C/alpha thalassemia, Hb C percentage is lower than the usual percent seen in Hb C trait with the presence of microcytosis.          BMI 30.0-30.9,adult 07/18/2014     Priority: Medium     7/18/14:  A1C:  5.4   Glucose:  84 mg/dl      GCT @ 24 wks= 123 mg/dL  3/20/15 postpartum visit;  Body mass index is 32.38 kg/(m^2).          Past Medical History:   Diagnosis Date     NO ACTIVE PROBLEMS (aka NONE)      Past Surgical History:   Procedure Laterality Date     DILATION AND CURETTAGE SUCTION  2003/08/10    x 3     Current Outpatient Prescriptions   Medication Sig Dispense Refill     albuterol (PROAIR HFA/PROVENTIL HFA/VENTOLIN HFA)  "108 (90 Base) MCG/ACT Inhaler Inhale 2 puffs into the lungs every 6 hours as needed for shortness of breath / dyspnea or wheezing 1 Inhaler 1     levonorgestrel (MIRENA) 20 MCG/24HR IUD 1 each (20 mcg) by Intrauterine route once for 1 dose 1 each 0     OTC products: None, except as noted above    No Known Allergies   Latex Allergy: NO    Social History   Substance Use Topics     Smoking status: Never Smoker     Smokeless tobacco: Never Used     Alcohol use Yes      Comment: 1-11 a year     History   Drug Use No       REVIEW OF SYSTEMS:   CONSTITUTIONAL: NEGATIVE for fever, chills, change in weight  INTEGUMENTARY/SKIN: NEGATIVE for worrisome rashes, moles or lesions  EYES: NEGATIVE for vision changes or irritation  ENT/MOUTH: NEGATIVE for ear, mouth and throat problems  RESP: POSITIVE for new intermittant SOB  BREAST: NEGATIVE for masses, tenderness or discharge  CV: NEGATIVE for chest pain, palpitations or peripheral edema  GI: positive for nausea, abdominal pain, negative for heartburn, or change in bowel habits  : NEGATIVE for frequency, dysuria, or hematuria  MUSCULOSKELETAL: NEGATIVE for significant arthralgias or myalgia  NEURO: NEGATIVE for weakness, dizziness or paresthesias  ENDOCRINE: NEGATIVE for temperature intolerance, skin/hair changes  HEME: NEGATIVE for bleeding problems  PSYCHIATRIC: NEGATIVE for changes in mood or affect    EXAM:   /84 (BP Location: Right arm, Patient Position: Sitting, Cuff Size: Adult Large)  Pulse 102  Temp 98.8  F (37.1  C) (Oral)  Resp 20  Ht 1.74 m (5' 8.5\")  Wt 122.7 kg (270 lb 6.4 oz)  SpO2 97%  BMI 40.52 kg/m2    GENERAL APPEARANCE: healthy, alert and no distress     EYES: EOMI, PERRL     HENT: ear canals and TM's normal and nose and mouth without ulcers or lesions     NECK: no adenopathy, no asymmetry, masses, or scars and thyroid -MICHAEL due to obesity     RESP: lungs clear to auscultation - no rales, rhonchi or wheezes. No cough, no SOB currently. sats and RR " WNL     CV: regular rates and rhythm, normal S1 S2, no S3 or S4 and no murmur, click or rub     ABDOMEN:  Soft, obese, nontender, MICHAEL HSM  Due to obesity, and bowel sounds normal     MS: extremities normal- no gross deformities noted, no evidence of inflammation in joints, FROM in all extremities.     SKIN: no suspicious lesions or rashes     NEURO: Normal strength and tone, sensory exam grossly normal, mentation intact and speech normal     PSYCH: mentation appears normal. and affect normal/bright     LYMPHATICS: No cervical adenopathy    DIAGNOSTICS:   EKG: appears normal, NSR, normal axis, normal intervals, no acute ST/T changes c/w ischemia, no LVH by voltage criteria    Recent Labs   Lab Test  08/09/16   1259  05/06/16   1204  04/26/16   1202   07/18/14   1215   HGB  12.6  12.9  12.4   < >  11.4*   PLT  247  241  221   < >  236   NA   --    --   138   --    --    POTASSIUM   --    --   3.5   --    --    CR   --    --   0.85   --    --    A1C   --    --    --    --   5.4    < > = values in this interval not displayed.    labs pending    IMPRESSION:   Reason for surgery/procedure: gall stones  Diagnosis/reason for consult: gall stone removal    The proposed surgical procedure is considered LOW risk.    REVISED CARDIAC RISK INDEX  The patient has the following serious cardiovascular risks for perioperative complications such as (MI, PE, VFib and 3  AV Block):  No serious cardiac risks  INTERPRETATION: 0 risks: Class I (very low risk - 0.4% complication rate)    The patient has the following additional risks for perioperative complications:  No identified additional risks      ICD-10-CM    1. Preop general physical exam Z01.818 EKG 12-lead complete w/read - Clinics     CBC with platelets     Basic metabolic panel     Basic metabolic panel   2. History of gallstones Z87.19    3. SOB (shortness of breath) R06.02 XR Chest 2 Views     TSH with free T4 reflex     albuterol (PROAIR HFA/PROVENTIL HFA/VENTOLIN HFA) 108 (90  Base) MCG/ACT Inhaler   4. Morbid obesity (H) E66.01        RECOMMENDATIONS:     --Consult hospital rounder / IM to assist post-op medical management.    -- Patient is morbidly obese, risk of hypoventilation syndrome r/t obesity post sedation. Also having some SOB intermittently since having a cold a month ago-she describes it more as 'catching her breath' than on-going SOB. Chest x-ray and EKG normal and sats normal, no chest pain. Likely due to her cold. She will let provider know if it is worsening or not improving.  She has albuterol prn.    --Patient is to take all scheduled medications on the day of surgery EXCEPT for modifications listed below.    APPROVAL GIVEN to proceed with proposed procedure, without further diagnostic evaluation at this time.        Signed Electronically by: KAREY Pitt, NP-C    Copy of this evaluation report is provided to requesting physician.    [facility] Preop Guidelines    Revised Cardiac Risk Index

## 2018-05-07 NOTE — DISCHARGE INSTRUCTIONS
[facility]  Same-Day Surgery   Adult Discharge Orders & Instructions   For 24 hours after surgery  1. Get plenty of rest.  A responsible adult must stay with you for at least 24 hours after you leave the hospital.   2. Do not drive or use heavy equipment.  If you have weakness or tingling, don't drive or use heavy equipment until this feeling goes away.  3. Do not drink alcohol.  4. Avoid strenuous or risky activities.  Ask for help when climbing stairs.   5. You may feel lightheaded.  IF so, sit for a few minutes before standing.  Have someone help you get up.   6. If you have nausea (feel sick to your stomach): Drink only clear liquids such as apple juice, ginger ale, broth or 7-Up.  Rest may also help.  Be sure to drink enough fluids.  Move to a regular diet as you feel able.  7. You may have a slight fever. Call the doctor if your fever is over 100 F (37.7 C) (taken under the tongue) or lasts longer than 24 hours.  8. You may have a dry mouth, a sore throat, muscle aches or trouble sleeping.  These should go away after 24 hours.  9. Do not make important or legal decisions.   Call your doctor for any of the followin.  Signs of infection (fever, growing tenderness at the surgery site, a large amount of drainage or bleeding, severe pain, foul-smelling drainage, redness, swelling).    2. It has been over 8 to 10 hours since surgery and you are still not able to urinate (pass water).    3.  Headache for over 24 hours.    4.  Numbness, tingling or weakness the day after surgery (if you had spinal anesthesia).

## 2018-05-07 NOTE — IP AVS SNAPSHOT
[facility]    [address] [place] [address]    Phone:  [phone]                                       After Visit Summary   5/7/2018    Minerva Andrade    MRN: [id number]           After Visit Summary Signature Page     I have received my discharge instructions, and my questions have been answered. I have discussed any challenges I see with this plan with the nurse or doctor.    ..........................................................................................................................................  Patient/Patient Representative Signature      ..........................................................................................................................................  Patient Representative Print Name and Relationship to Patient    ..................................................               ................................................  Date                                            Time    ..........................................................................................................................................  Reviewed by Signature/Title    ...................................................              ..............................................  Date                                                            Time

## 2018-05-10 LAB — COPATH REPORT: NORMAL

## 2018-05-22 ENCOUNTER — TELEPHONE (OUTPATIENT)
Dept: SURGERY | Facility: CLINIC | Age: 43
End: 2018-05-22

## 2018-05-22 NOTE — TELEPHONE ENCOUNTER
[facility] Call Center    Phone Message    May a detailed message be left on voicemail: yes    Reason for Call: Other: [employer] states they sent a request form to confirm that post op was scheduled. Please confirm receipt of forms. call to discuss     Action Taken: Message routed to:  Adult Clinics: Surgery, General p [id number]

## 2018-05-23 NOTE — TELEPHONE ENCOUNTER
Nurse call [employer] at [phone] and requested forms to be re faxed.    Liza Mcdaniels RN, BSN, PHN  [facility]  GI/Gen Surg/Hepatology Care Coordinator

## 2018-05-30 NOTE — TELEPHONE ENCOUNTER
Nurse called [employer] and updated that she has a post op appointment on 6/4/18 and should be cleared to return to work the following day, they updated the information.    Nurse called patient and left message on VM to return call to discuss.    Liza Mcdaniels RN, BSN, PHN  [facility]  GI/Gen Surg/Hepatology Care Coordinator

## 2018-05-30 NOTE — TELEPHONE ENCOUNTER
Nurse updated pt.    Liza Mcdaniels RN, BSN, PHN  [facility]  GI/Gen Surg/Hepatology Care Coordinator

## 2018-06-04 ENCOUNTER — OFFICE VISIT (OUTPATIENT)
Dept: SURGERY | Facility: CLINIC | Age: 43
End: 2018-06-04
Payer: COMMERCIAL

## 2018-06-04 VITALS — SYSTOLIC BLOOD PRESSURE: 126 MMHG | DIASTOLIC BLOOD PRESSURE: 78 MMHG | HEART RATE: 103 BPM

## 2018-06-04 DIAGNOSIS — K80.20 SYMPTOMATIC CHOLELITHIASIS: Primary | ICD-10-CM

## 2018-06-04 PROCEDURE — 99024 POSTOP FOLLOW-UP VISIT: CPT | Performed by: SURGERY

## 2018-06-04 ASSESSMENT — PAIN SCALES - GENERAL: PAINLEVEL: MODERATE PAIN (5)

## 2018-06-04 NOTE — LETTER
6/4/2018         RE: Minerva Andrade  [address]   [address]  [place] [place] [address]        Dear Colleague,    Thank you for referring your patient, Minerva Andrade, to the [facility]. Please see a copy of my visit note below.    42 year old woman status post lap darwin 4 weeks ago. The patient has no complaints, is off narcotics, and has returned to normal activities.     /78  Pulse 103    Abdomen: Incisions well healed, no tenderness to palpation     Pathology: Chronic cholecystitis with cholelithiasis     Impression and Plan:  Doing well status post lap darwin. F/U prn.     Again, thank you for allowing me to participate in the care of your patient.        Sincerely,        Alicia Medina MD

## 2018-06-04 NOTE — NURSING NOTE
Minerva Andrade's goals for this visit include:   Chief Complaint   Patient presents with     RECHECK     4 week post op- pt is stating she is still having some pain and trouble sleeping        She requests these members of her care team be copied on today's visit information: yes    PCP: Angelica Santos    Referring Provider:  No referring provider defined for this encounter.    /78  Pulse 103    Do you need any medication refills at today's visit? No     Amorrae ZAN Camarena

## 2018-06-04 NOTE — PROGRESS NOTES
42 year old woman status post lap darwin 4 weeks ago. The patient has no complaints, is off narcotics, and has returned to normal activities.     /78  Pulse 103    Abdomen: Incisions well healed, no tenderness to palpation     Pathology: Chronic cholecystitis with cholelithiasis     Impression and Plan:  Doing well status post lap darwin. F/U prn.

## 2018-06-04 NOTE — MR AVS SNAPSHOT
After Visit Summary   6/4/2018    Minerva Andrade    MRN: [id number]           Patient Information     Date Of Birth          1975        Visit Information        Provider Department      6/4/2018 3:00 PM Alicia Medina MD [facility]        Today's Diagnoses     Symptomatic cholelithiasis    -  1       Follow-ups after your visit        Who to contact     If you have questions or need follow up information about today's clinic visit or your schedule please contact [facility] directly at [phone].  Normal or non-critical lab and imaging results will be communicated to you by Hey[employer], letter or phone within 4 business days after the clinic has received the results. If you do not hear from us within 7 days, please contact the clinic through Heyohart or phone. If you have a critical or abnormal lab result, we will notify you by phone as soon as possible.  Submit refill requests through [employer] or call your pharmacy and they will forward the refill request to us. Please allow 3 business days for your refill to be completed.          Additional Information About Your Visit        MyChart Information     [employer] gives you secure access to your electronic health record. If you see a primary care provider, you can also send messages to your care team and make appointments. If you have questions, please call your primary care clinic.  If you do not have a primary care provider, please call [phone] and they will assist you.      [employer] is an electronic gateway that provides easy, online access to your medical records. With [employer], you can request a clinic appointment, read your test results, renew a prescription or communicate with your care team.     To access your existing account, please contact your [facility] or call [phone] for assistance.        Care EveryWhere ID     This is your Care EveryWhere ID. This could be  used by other organizations to access your [place] medical records  [id number]        Your Vitals Were     Pulse                   103            Blood Pressure from Last 3 Encounters:   06/04/18 126/78   05/07/18 (!) 131/103   04/18/18 118/84    Weight from Last 3 Encounters:   04/18/18 122.7 kg (270 lb 6.4 oz)   04/16/18 120.7 kg (266 lb)   03/20/18 120.7 kg (266 lb)              Today, you had the following     No orders found for display       Primary Care Provider Office Phone # Fax #    Angelica Santos, RAMONA [phone]       [address]  [place] [address]        Equal Access to Services     DYLON ARGUELLO : Hadii preeti samuel Sodashawn, waaxda luqadaha, qaybta kaalmada adeegyada, emmanuel nance . So [facility] [phone].    ATENCIÓN: Si habla español, tiene a pierson disposición servicios gratuitos de asistencia lingüística. Llame al [phone].    We comply with applicable federal civil rights laws and [place] laws. We do not discriminate on the basis of race, color, national origin, age, disability, sex, sexual orientation, or gender identity.            Thank you!     Thank you for choosing [facility]  for your care. Our goal is always to provide you with excellent care. Hearing back from our patients is one way we can continue to improve our services. Please take a few minutes to complete the written survey that you may receive in the mail after your visit with us. Thank you!             Your Updated Medication List - Protect others around you: Learn how to safely use, store and throw away your medicines at www.disposemymeds.org.          This list is accurate as of 6/4/18  3:12 PM.  Always use your most recent med list.                   Brand Name Dispense Instructions for use Diagnosis    albuterol 108 (90 Base) MCG/ACT Inhaler    PROAIR HFA/PROVENTIL HFA/VENTOLIN HFA    1 Inhaler    Inhale 2 puffs into the lungs every 6 hours as needed for  shortness of breath / dyspnea or wheezing    SOB (shortness of breath)       levonorgestrel 20 MCG/24HR IUD    MIRENA    1 each    1 each (20 mcg) by Intrauterine route once for 1 dose    Encounter for IUD insertion, Pregnancy, status unknown       meloxicam 15 MG tablet    MOBIC    5 tablet    Take 1 tablet (15 mg) by mouth daily    Symptomatic cholelithiasis       oxyCODONE-acetaminophen 5-325 MG per tablet    PERCOCET    30 tablet    Take 1-2 tablets by mouth every 4 hours as needed for pain (moderate to severe)    Symptomatic cholelithiasis

## 2018-07-12 ENCOUNTER — TELEPHONE (OUTPATIENT)
Dept: FAMILY MEDICINE | Facility: CLINIC | Age: 43
End: 2018-07-12

## 2018-07-12 NOTE — TELEPHONE ENCOUNTER
Panel Management Review        Last Office Visit with this department: 4/18/2018    Fail List measure: PAP      Patient is due/failing the following:   PAP    Action needed:   Patient needs office visit for Physical with Pap.    Type of outreach:    Sent [employer] message.    Questions for provider review:    None                                                                                                                                    Felicitas Luna MA

## 2018-10-31 ENCOUNTER — ALLIED HEALTH/NURSE VISIT (OUTPATIENT)
Dept: NURSING | Facility: CLINIC | Age: 43
End: 2018-10-31
Payer: COMMERCIAL

## 2018-10-31 ENCOUNTER — OFFICE VISIT (OUTPATIENT)
Dept: FAMILY MEDICINE | Facility: CLINIC | Age: 43
End: 2018-10-31
Payer: COMMERCIAL

## 2018-10-31 VITALS
SYSTOLIC BLOOD PRESSURE: 128 MMHG | RESPIRATION RATE: 20 BRPM | OXYGEN SATURATION: 98 % | HEART RATE: 68 BPM | WEIGHT: 279 LBS | TEMPERATURE: 98.9 F | DIASTOLIC BLOOD PRESSURE: 82 MMHG | HEIGHT: 69 IN | BODY MASS INDEX: 41.32 KG/M2

## 2018-10-31 DIAGNOSIS — Z12.4 SCREENING FOR MALIGNANT NEOPLASM OF CERVIX: ICD-10-CM

## 2018-10-31 DIAGNOSIS — R63.5 WEIGHT GAIN: ICD-10-CM

## 2018-10-31 DIAGNOSIS — Z13.21 ENCOUNTER FOR VITAMIN DEFICIENCY SCREENING: ICD-10-CM

## 2018-10-31 DIAGNOSIS — Z12.31 VISIT FOR SCREENING MAMMOGRAM: ICD-10-CM

## 2018-10-31 DIAGNOSIS — Z23 NEED FOR PROPHYLACTIC VACCINATION AND INOCULATION AGAINST INFLUENZA: Primary | ICD-10-CM

## 2018-10-31 DIAGNOSIS — Z13.1 SCREENING FOR DIABETES MELLITUS: ICD-10-CM

## 2018-10-31 DIAGNOSIS — Z30.432 ENCOUNTER FOR REMOVAL OF INTRAUTERINE CONTRACEPTIVE DEVICE (IUD): Primary | ICD-10-CM

## 2018-10-31 DIAGNOSIS — Z13.220 SCREENING FOR HYPERLIPIDEMIA: ICD-10-CM

## 2018-10-31 DIAGNOSIS — E66.01 MORBID OBESITY (H): ICD-10-CM

## 2018-10-31 PROCEDURE — 90471 IMMUNIZATION ADMIN: CPT

## 2018-10-31 PROCEDURE — 99207 ZZC NO CHARGE NURSE ONLY: CPT

## 2018-10-31 PROCEDURE — 99213 OFFICE O/P EST LOW 20 MIN: CPT | Performed by: PHYSICIAN ASSISTANT

## 2018-10-31 PROCEDURE — 87624 HPV HI-RISK TYP POOLED RSLT: CPT | Performed by: PHYSICIAN ASSISTANT

## 2018-10-31 PROCEDURE — 90686 IIV4 VACC NO PRSV 0.5 ML IM: CPT

## 2018-10-31 PROCEDURE — G0145 SCR C/V CYTO,THINLAYER,RESCR: HCPCS | Performed by: PHYSICIAN ASSISTANT

## 2018-10-31 ASSESSMENT — PAIN SCALES - GENERAL: PAINLEVEL: NO PAIN (0)

## 2018-10-31 NOTE — PROGRESS NOTES
Injectable Influenza Immunization Documentation    1.  Is the person to be vaccinated sick today?   No    2. Does the person to be vaccinated have an allergy to a component   of the vaccine?   No  Egg Allergy Algorithm Link    3. Has the person to be vaccinated ever had a serious reaction   to influenza vaccine in the past?   No    4. Has the person to be vaccinated ever had Guillain-Barré syndrome?   No    Form completed by Felicitas Luna MA on 10/31/2018 at 1:43 PM

## 2018-10-31 NOTE — PATIENT INSTRUCTIONS
Please schedule a fasting lab appointment (nothing to eat or drink 8 hours prior except water and/or your medications) at your earliest convenience.      Schedule mammogram at [facility] ([phone]).      Please contact comprehensive Weight Management Program for help with weight loss.  [phone].       schedule appointment with gynecology at [facility] ([phone]) to discuss copper iud and other forms of birth control.

## 2018-10-31 NOTE — MR AVS SNAPSHOT
After Visit Summary   10/31/2018    Minerva Andrade    MRN: [id number]           Patient Information     Date Of Birth          1975        Visit Information        Provider Department      10/31/2018 1:40 PM BA ANCILLARY [facility]        Today's Diagnoses     Need for prophylactic vaccination and inoculation against influenza    -  1       Follow-ups after your visit        Your next 10 appointments already scheduled     Oct 31, 2018  3:00 PM CDT   Office Visit with Citlalli Montaño PA-C   [facility]    [address] [place] [address] [phone]           Bring a current list of meds and any records pertaining to this visit. For Physicals, please bring immunization records and any forms needing to be filled out. Please arrive 10 minutes early to complete paperwork.              Who to contact     If you have questions or need follow up information about today's clinic visit or your schedule please contact [facility] directly at [phone].  Normal or non-critical lab and imaging results will be communicated to you by "[employer]hart, letter or phone within 4 business days after the clinic has received the results. If you do not hear from us within 7 days, please contact the clinic through [employer] or phone. If you have a critical or abnormal lab result, we will notify you by phone as soon as possible.  Submit refill requests through [employer] or call your pharmacy and they will forward the refill request to us. Please allow 3 business days for your refill to be completed.          Additional Information About Your Visit        "[employer]hart Information     [employer] gives you secure access to your electronic health record. If you see a primary care provider, you can also send messages to your care team and make appointments. If you have questions, please call your primary care clinic.  If you do not have a  primary care provider, please call [phone] and they will assist you.        Care EveryWhere ID     This is your Care EveryWhere ID. This could be used by other organizations to access your [place] medical records  [id number]         Blood Pressure from Last 3 Encounters:   06/04/18 126/78   05/07/18 (!) 131/103   04/18/18 118/84    Weight from Last 3 Encounters:   04/18/18 122.7 kg (270 lb 6.4 oz)   04/16/18 120.7 kg (266 lb)   03/20/18 120.7 kg (266 lb)              We Performed the Following     FLU VACCINE, SPLIT VIRUS, IM (QUADRIVALENT) [72471]- >3 YRS     Vaccine Administration, Initial [37761]        Primary Care Provider Office Phone # Fax #    Angelica LopezRAMONA reilly [phone] [address]  [place] [address]        Equal Access to Services     ROBIN ARGUELLO : Hadii aad ku hadasho Soomaali, waaxda luqadaha, qaybta kaalmada adeegyada, waxay franin hayluin randi nance . So [facility] [phone].    ATENCIÓN: Si habla español, tiene a pierson disposición servicios gratuitos de asistencia lingüística. Llame al [phone].    We comply with applicable federal civil rights laws and [place] laws. We do not discriminate on the basis of race, color, national origin, age, disability, sex, sexual orientation, or gender identity.            Thank you!     Thank you for choosing [facility]  for your care. Our goal is always to provide you with excellent care. Hearing back from our patients is one way we can continue to improve our services. Please take a few minutes to complete the written survey that you may receive in the mail after your visit with us. Thank you!             Your Updated Medication List - Protect others around you: Learn how to safely use, store and throw away your medicines at www.disposemymeds.org.          This list is accurate as of 10/31/18  1:44 PM.  Always use your most recent med list.                   Brand Name Dispense Instructions for use  Diagnosis    albuterol 108 (90 Base) MCG/ACT inhaler    PROAIR HFA/PROVENTIL HFA/VENTOLIN HFA    1 Inhaler    Inhale 2 puffs into the lungs every 6 hours as needed for shortness of breath / dyspnea or wheezing    SOB (shortness of breath)       levonorgestrel 20 MCG/24HR IUD    MIRENA    1 each    1 each (20 mcg) by Intrauterine route once for 1 dose    Encounter for IUD insertion, Pregnancy, status unknown       meloxicam 15 MG tablet    MOBIC    5 tablet    Take 1 tablet (15 mg) by mouth daily    Symptomatic cholelithiasis       oxyCODONE-acetaminophen 5-325 MG per tablet    PERCOCET    30 tablet    Take 1-2 tablets by mouth every 4 hours as needed for pain (moderate to severe)    Symptomatic cholelithiasis

## 2018-10-31 NOTE — PROGRESS NOTES
SUBJECTIVE:   Minerva Andrade is a 43 year old female who presents to clinic today for the following health issues:    IUD Removal    Wants iud removed.  Has not decided on alternative contraception.    Still having  headaches.  Estimates 50 pounds Weight gain, mood swings.    Not using condoms.  No concern for sexually transmitted diseases.   Has one child age 3.   Exercise not much.  Sits all day at work.  Not at work tries to exercise.   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  Diet ok.    Would like to see nutritionist.   Has never had mammogram.    Has never had fasting labs     Problem list and histories reviewed & adjusted, as indicated.  Additional history: as documented    Patient Active Problem List   Diagnosis     BMI 40.0-44.9, adult (H)     Hemoglobin C (Hb-C)   Alpha Thalessemia      Elevated BP     IUD (intrauterine device) in place     Chronic migraine without aura with status migrainosus, not intractable     History of gallstones     Morbid obesity (H)     Past Surgical History:   Procedure Laterality Date     DILATION AND CURETTAGE SUCTION  2003/08/10    x 3     LAPAROSCOPIC CHOLECYSTECTOMY N/A 5/7/2018    Procedure: LAPAROSCOPIC CHOLECYSTECTOMY;  Laparoscopic cholecystectomy;  Surgeon: Alicia Medina MD;  Location:  OR       Social History   Substance Use Topics     Smoking status: Never Smoker     Smokeless tobacco: Never Used     Alcohol use Yes      Comment: 1-11 a year     Family History   Problem Relation Age of Onset     Diabetes Mother      Hypertension Mother      Arthritis Maternal Grandmother      Arthritis Maternal Aunt          Current Outpatient Prescriptions   Medication Sig Dispense Refill     albuterol (PROAIR HFA/PROVENTIL HFA/VENTOLIN HFA) 108 (90 Base) MCG/ACT Inhaler Inhale 2 puffs into the lungs every 6 hours as needed for shortness of breath / dyspnea or wheezing 1 Inhaler 1     levonorgestrel (MIRENA) 20 MCG/24HR IUD 1 each (20 mcg) by  "Intrauterine route once for 1 dose 1 each 0       Reviewed and updated as needed this visit by clinical staff  Tobacco  Allergies  Meds  Med Hx  Surg Hx  Fam Hx  Soc Hx      Reviewed and updated as needed this visit by Provider  Tobacco  Allergies  Meds  Problems  Med Hx  Surg Hx  Fam Hx  Soc Hx          ROS:  Constitutional, HEENT, cardiovascular, pulmonary, gi and gu systems are negative, except as otherwise noted.    OBJECTIVE:     /82 (BP Location: Right arm, Patient Position: Chair, Cuff Size: Adult Large)  Pulse 68  Temp 98.9  F (37.2  C) (Oral)  Resp 20  Ht 1.74 m (5' 8.5\")  Wt 126.6 kg (279 lb)  LMP  (Exact Date)  SpO2 98%  Breastfeeding? No  BMI 41.8 kg/m2  Body mass index is 41.8 kg/(m^2).  GENERAL: alert, no distress and obese  NECK: no adenopathy, no asymmetry, masses, or scars and thyroid normal to palpation  RESP: lungs clear to auscultation - no rales, rhonchi or wheezes  CV: regular rate and rhythm, normal S1 S2, no S3 or S4, no murmur, click or rub, no peripheral edema and peripheral pulses strong  ABDOMEN: soft, nontender, no hepatosplenomegaly, no masses and bowel sounds normal   (female): normal female external genitalia, normal urethral meatus, vaginal mucosa, normal cervix/adnexa/uterus without masses or discharge, iud strings easily visualized.  Using ring forceps gentle retraction used and iud removed in its entirety easily  MS: no gross musculoskeletal defects noted, no edema    Diagnostic Test Results:  none     ASSESSMENT/PLAN:         BMI:   Estimated body mass index is 41.8 kg/(m^2) as calculated from the following:    Height as of this encounter: 1.74 m (5' 8.5\").    Weight as of this encounter: 126.6 kg (279 lb).   Weight management plan: Specific weight management program called bariatric program  discussed and follow up in 6 months in clinic to re-evaluate.      1. Encounter for removal of intrauterine contraceptive device (IUD)  iud removed easily.  " Patient tolerated well     2. Morbid obesity (H)  Encouraged follow up with bariatric program for nutrition and consider medications   - BARIATRIC ADULT REFERRAL    3. Screening for malignant neoplasm of cervix  Pap pending  - Pap imaged thin layer screen with HPV - recommended age 30 - 65 years (select HPV order below)  - HPV High Risk Types DNA Cervical    4. Screening for hyperlipidemia  Will return for fasting labs   - Lipid panel reflex to direct LDL Fasting; Future    5. Screening for diabetes mellitus   as above   - Glucose; Future    6. Encounter for vitamin deficiency screening  Will return for labs.   - Vitamin D Deficiency; Future    7. Visit for screening mammogram  Encouraged to schedule baseline   - *MA Screening Digital Bilateral; Future    Patient Instructions   Please schedule a fasting lab appointment (nothing to eat or drink 8 hours prior except water and/or your medications) at your earliest convenience.      Schedule mammogram at [facility] ([phone]).      Please contact comprehensive Weight Management Program for help with weight loss.  [phone].       schedule appointment with gynecology at [facility] ([phone]) to discuss copper iud and other forms of birth control.         CC chart to PCP for zohra Montaño PA-C  [facility]

## 2018-10-31 NOTE — MR AVS SNAPSHOT
After Visit Summary   10/31/2018    Minerva Andrade    MRN: [id number]           Patient Information     Date Of Birth          1975        Visit Information        Provider Department      10/31/2018 3:00 PM Citlalli Montaño PA-C [facility]        Today's Diagnoses     Screening for malignant neoplasm of cervix    -  1    Screening for hyperlipidemia        Screening for diabetes mellitus        Encounter for vitamin deficiency screening        Visit for screening mammogram        Morbid obesity (H)          Care Instructions    Please schedule a fasting lab appointment (nothing to eat or drink 8 hours prior except water and/or your medications) at your earliest convenience.      Schedule mammogram at [facility] ([phone]).      Please contact [facility] Management Program for help with weight loss.  [phone].       schedule appointment with gynecology at [facility] ([phone]) to discuss copper iud and other forms of birth control.                Follow-ups after your visit        Additional Services     BARIATRIC ADULT REFERRAL       Your provider has referred you to: FMG: [facility] [place] [phone] [email or web address]  UMP: [facility] Comprehensive [facility] [phone]  [email or web address]  HE: [facility] Comprehensive Weight Management Program [facility] [phone] [email or web address]    Please be aware that coverage of these services is subject to the terms and limitations of your health insurance plan.  Call member services at your health plan with any benefit  or coverage questions.      Please bring the following with you to your appointment:      (1) List of current medications   (2) This referral request   (3) Any documents/labs given to you for this referral                  Follow-up notes from your care team     Return in about 1 week (around 11/7/2018) for fasting labs .      Future tests that were ordered for you today     Open Future Orders        Priority Expected Expires Ordered    *MA Screening Digital Bilateral Routine  10/31/2019 10/31/2018    Lipid panel reflex to direct LDL Fasting Routine 10/31/2018 11/14/2018 10/31/2018    Glucose Routine  10/31/2019 10/31/2018    Vitamin D Deficiency Routine  10/31/2019 10/31/2018            Who to contact     If you have questions or need follow up information about today's clinic visit or your schedule please contact [facility] directly at [phone].  Normal or non-critical lab and imaging results will be communicated to you by MyChart, letter or phone within 4 business days after the clinic has received the results. If you do not hear from us within 7 days, please contact the clinic through [employer] or phone. If you have a critical or abnormal lab result, we will notify you by phone as soon as possible.  Submit refill requests through [employer] or call your pharmacy and they will forward the refill request to us. Please allow 3 business days for your refill to be completed.          Additional Information About Your Visit        [employer] Information     [employer] gives you secure access to your electronic health record. If you see a primary care provider, you can also send messages to your care team and make appointments. If you have questions, please call your primary care clinic.  If you do not have a primary care provider, please call [phone] and they will assist you.        Care EveryWhere ID     This is your Care EveryWhere ID. This could be used by other organizations to access your [facility]  "medical records  [id number]        Your Vitals Were     Pulse Temperature Respirations Height Last Period Pulse Oximetry    68 98.9  F (37.2  C) (Oral) 20 1.74 m (5' 8.5\") (Exact Date) 98%    Breastfeeding? BMI (Body Mass Index)                No 41.8 kg/m2           Blood Pressure from Last 3 Encounters:   10/31/18 128/82   06/04/18 126/78   05/07/18 (!) 131/103    Weight from Last 3 Encounters:   10/31/18 126.6 kg (279 lb)   04/18/18 122.7 kg (270 lb 6.4 oz)   04/16/18 120.7 kg (266 lb)              We Performed the Following     BARIATRIC ADULT REFERRAL     HPV High Risk Types DNA Cervical     Pap imaged thin layer screen with HPV - recommended age 30 - 65 years (select HPV order below)        Primary Care Provider Office Phone # Fax #    Angelica Santos, RAMONA [phone] [address]  [facility] [address]        Equal Access to Services     ROBIN ARGUELLO : Hadii aad ku hadasho Soomaali, waaxda luqadaha, qaybta kaalmada adeegyada, waxay alberto nance . So [facility] [phone].    ATENCIÓN: Si habla español, tiene a pierson disposición servicios gratuitos de asistencia lingüística. Llame al [phone].    We comply with applicable federal civil rights laws and [place] laws. We do not discriminate on the basis of race, color, national origin, age, disability, sex, sexual orientation, or gender identity.            Thank you!     Thank you for choosing [facility]  for your care. Our goal is always to provide you with excellent care. Hearing back from our patients is one way we can continue to improve our services. Please take a few minutes to complete the written survey that you may receive in the mail after your visit with us. Thank you!             Your Updated Medication List - Protect others around you: Learn how to safely use, store and throw away your medicines at www.disposemymeds.org.          This list is accurate as of 10/31/18  3:21 PM.  Always use " your most recent med list.                   Brand Name Dispense Instructions for use Diagnosis    albuterol 108 (90 Base) MCG/ACT inhaler    PROAIR HFA/PROVENTIL HFA/VENTOLIN HFA    1 Inhaler    Inhale 2 puffs into the lungs every 6 hours as needed for shortness of breath / dyspnea or wheezing    SOB (shortness of breath)       levonorgestrel 20 MCG/24HR IUD    MIRENA    1 each    1 each (20 mcg) by Intrauterine route once for 1 dose    Encounter for IUD insertion, Pregnancy, status unknown

## 2018-11-02 LAB
COPATH REPORT: NORMAL
PAP: NORMAL

## 2018-11-05 LAB
FINAL DIAGNOSIS: NORMAL
HPV HR 12 DNA CVX QL NAA+PROBE: NEGATIVE
HPV16 DNA SPEC QL NAA+PROBE: NEGATIVE
HPV18 DNA SPEC QL NAA+PROBE: NEGATIVE
SPECIMEN DESCRIPTION: NORMAL
SPECIMEN SOURCE CVX/VAG CYTO: NORMAL

## 2018-11-14 ENCOUNTER — RADIANT APPOINTMENT (OUTPATIENT)
Dept: MAMMOGRAPHY | Facility: CLINIC | Age: 43
End: 2018-11-14
Attending: PHYSICIAN ASSISTANT
Payer: COMMERCIAL

## 2018-11-14 DIAGNOSIS — Z12.31 VISIT FOR SCREENING MAMMOGRAM: ICD-10-CM

## 2018-11-14 PROCEDURE — 77067 SCR MAMMO BI INCL CAD: CPT

## 2018-11-27 ENCOUNTER — OFFICE VISIT (OUTPATIENT)
Dept: ENDOCRINOLOGY | Facility: CLINIC | Age: 43
End: 2018-11-27
Attending: PHYSICIAN ASSISTANT
Payer: COMMERCIAL

## 2018-11-27 ENCOUNTER — ALLIED HEALTH/NURSE VISIT (OUTPATIENT)
Dept: SURGERY | Facility: CLINIC | Age: 43
End: 2018-11-27
Payer: COMMERCIAL

## 2018-11-27 VITALS
DIASTOLIC BLOOD PRESSURE: 98 MMHG | HEART RATE: 87 BPM | WEIGHT: 279.6 LBS | SYSTOLIC BLOOD PRESSURE: 133 MMHG | HEIGHT: 69 IN | BODY MASS INDEX: 41.41 KG/M2 | OXYGEN SATURATION: 98 %

## 2018-11-27 DIAGNOSIS — E66.01 MORBID OBESITY (H): Primary | ICD-10-CM

## 2018-11-27 RX ORDER — PHENTERMINE HYDROCHLORIDE 37.5 MG/1
18.75 TABLET ORAL
Qty: 30 TABLET | Refills: 1 | Status: SHIPPED | OUTPATIENT
Start: 2018-11-27 | End: 2021-09-24

## 2018-11-27 RX ORDER — TOPIRAMATE 25 MG/1
TABLET, FILM COATED ORAL
Qty: 270 TABLET | Refills: 1 | Status: SHIPPED | OUTPATIENT
Start: 2018-11-27 | End: 2021-09-24

## 2018-11-27 NOTE — PROGRESS NOTES
"Minerva Andrade is a 43 year old female presents today for new weight management nutrition consultation.  Patient referred by Dr. Amelia Rico(11/27/18).    Estimated body mass index is 41.89 kg/(m^2) as calculated from the following:    Height as of an earlier encounter on 11/27/18: 1.74 m (5' 8.5\").    Weight as of an earlier encounter on 11/27/18: 126.8 kg (279 lb 9.6 oz).    Nutrition history  See MD note for details.  Breakfast: skip on week days  Lunch: rice, vegetables, meat  Dinner: protein, rice, vegetable  Eating out on weekend    Nutrition Prescription  Meal planning    Nutrition Diagnosis  Obesity r/t long history of self-monitoring deficit and excessive energy intake aeb BMI >30.     Nutrition Intervention  Materials/education provided on portion control and healthy food choices (The Plate Method), meal and snack planning and websites, sample meal plans.  Discussed words to look for in restaurant menus to reduce calories and trying to choose smaller portions or take half of the meal home.  Encouraged eating three meals per day, patient is currently skipping breakfast to run out the door in the morning for work, discussed fast breakfast options with protein.    Patient Understanding: good  Expected Compliance: good  Follow-Up Plans: calorie counting     Nutrition Goals  1) Eat three meals per day   -protein at each meal  2) Increase activity as able, use the treadmill twice per week    Healthy Recipe Resources:  \"The Volumetrics Eating Plan\" by Damari Barnhart, Ph.D.  www.Pinpoint MD.com  www.hungryVendAstagirl.com  www.oldwayspt.org  Dash Diet Recipes [facility]    *Protein Shake Criteria: no more than 210 Calories, at least 20 grams of protein, and less than 10 grams of sugar     Meal Replacement Shake Options:   [facility] smoothie (160 Calories, 20 g protein)   Premier Protein (160 Calories, 30 g protein)  Slim Fast Advanced Nutrition (180 Calories, 20 g protein)  Muscle Milk, lactose-free, 17 oz " bottle (210 Calories, 30 g protein)  Integrated Supplements, no artificial sugars (110 Calories, 20 g protein)      Follow-Up:  PRN    Time spent with patient: 30 minutes.  Sabrina Alexander, RD, LD

## 2018-11-27 NOTE — MR AVS SNAPSHOT
After Visit Summary   11/27/2018    Minerva Andrade    MRN: [id number]           Patient Information     Date Of Birth          1975        Visit Information        Provider Department      11/27/2018 11:15 AM Amelia Rico MD [facility]        Today's Diagnoses     Morbid obesity (H)    -  1      Care Instructions    Welcome to our weight management program!   We are excited to join you on your weight loss journey    Thank you for allowing us the privilege of caring for you. We hope we provided you with the excellent service you deserve.    Please let us know if there is anything else we can do so that we can be sure you are leaving completely satisfied with your care experience.    You saw Dr. Amelia HIGGINBOTHAM today.    Instructions per today's visit:   Medications started today: Phentermine and Topiramate      Follow up appointments:  See Dr. Cisneros in 3 months at [place] location-call to schedule [phone]  Dietitian today or in near future    To reach Sona LPFRANCISCO for any questions/concerns call [phone]    To schedule appointments with our team, please call [phone]     Please call during clinic hours Monday through Friday 8:00a - 4:00p if you have questions or you can contact us via [email or web address] at anytime. ?    Fax: [phone]    Thank you,  Medical Weight Management Team          MEDICATION STARTED AT THIS APPOINTMENT    We are starting Phentermine. Take one tablet in the morning.  Call the nurse at [phone] if you have any questions or concerns. (Do not stop taking it if you don't think it's working. For some people it works without them knowing it.)    Phentermine is being prescribed because you identified hunger as one of the main causes for your extra weight.      Our patients on Phentermine find that they:    >feel less hunger    >find it easier to push the plate away   >have an easier time eating less    For some of our patients, these feelings  are very real and immediate. For other patients, the feelings are less obvious. They don't feel much of a change but find they've lost weight. Like all weight loss medications, Phentermine  works best when you help it work. This means:  1. Having less tempting high calorie (fattening) food around the house or office. (For people with strong cravings this is very important.)   2. Staying away from situations or people that may trigger your cravings .   3. Eating out only one time or less each week.  4. Eating your meals at a table with the TV or computer off.    Side-effects. Phentermine is generally well tolerated. The main side-effects we see are feelings of racing pulse or rapid heart beat. Some people can get an elevated blood pressure. Because of this we may have you come back within a week or so of starting the medication for a blood pressure check.         In order to get refills of this or any medication we prescribe you must be seen in the medical weight mgmt clinic every 2-3 months. Please have your pharmacy fax a refill request to [phone].      MEDICATION STARTED AT THIS APPOINTMENT  We are starting topiramate at bedtime.  Start one tab, 25 mg, for a week. Go up to 50 mg (2 tabs) for the next week. At the third week, take   3 tabs (75 mg).  Stay at 3 tabs until you are seen again. Call the nurse at [phone] if you have any questions or concerns. (Do not stop taking it if you don't think it's working. For some people it works even though they do not feel much different.)    Topiramate (Topamax) is a medication that is used most often to treat migraine headaches or for seizures. It has also been found to help with weight loss. Although it's not currently FDA approved for weight loss, it has been used safely for a number of years to help people who are carrying extra weight.     Just how topiramate helps with weight loss has not been exactly determined. However it seems to work on areas of the brain  to quiet down signals related to eating.      Topiramate may make you:    >feel less interest in eating in between meals   >think less about food and eating   >find it easier to push the plate away   >find giving up pop easier    >have an easier time eating less    For some of our patients, the pills work right away. They feel and think quite differently about food. Other patients don't feel much of a change but find in fact they have lost weight! Like all weight loss medications, topiramate works best when you help it work.  This means:    1) Have less tempting high calorie (fattening) food around the house or office    2) Have lower calorie food (fruits, vegetables,low fat meats and dairy) for snacks    3) Eat out only one time or less each week.   4) Eat your meals at a table with the TV or computer off.    Side-effects. Topiramate is generally well tolerated. The main side-effects we see are:   Tingling in hands,feet, or face (usually not very troublesome)   Mental confusion and word finding trouble (about 10% of patients have this.)     Feeling sleepy or a bit dopey- this goes away very soon after starting.    One of the dangers of topiramate is the possibility of birth defects--if you get pregnant when you are on it, there is the risk that your baby will be born with a cleft lip or palate.  If you are on topiramate and of child bearing age, you need to be on a reliable form of birth control or refrain from sexual intercourse.     Please refer to the pharmacy insert for more information on side-effects. Since many pharmacists are not familiar with the use of topiramate in weight loss, calling the clinic will get you the most accurate information on the use of this medication for weight loss.     In order to get refills of this or any medication we prescribe you must be seen in the medical weight mgmt clinic every 2-3 months. Please have your pharmacy fax a refill request to [phone].                   Follow-ups after your visit        Your next 10 appointments already scheduled     Dec 12, 2018  9:00 AM CST   (Arrive by 4:00 AM)   MA DIAGNOSTIC DIGITAL LEFT with UCBCMA2   [facility] ([facility])    [address]  [facility] [id number] [phone]           How do I prepare for my exam? (Food and drink instructions) No Food and Drink Restrictions.  How do I prepare for my exam? (Other instructions) Do not use any powder, lotion or deodorant under your arms or on your breast. If you do, we will ask you to remove it before your exam.  What should I wear: Wear comfortable, two-piece clothing.  How long does the exam take: Most scans will take 15 minutes.  What should I bring: Bring any previous mammograms from other facilities or have them mailed to the breast center.  Do I need a :  No  is needed.  What do I need to tell my doctor: If you have any allergies, tell your care team.  What should I do after the exam: No restrictions, You may resume normal activities.  What is this test: This test is an x-ray of the breast to look for breast disease. The breast is pressed between two plates to flatten and spread the tissue. An X-ray is taken of the breast from different angles.  Who should I call with questions: If you have any questions, please call the Imaging Department where you will have your exam. Directions, parking instructions, and other information is available on our website, [email or web address].  Other information about my exam Three-dimensional (3D) mammograms are available at [facility] locations in [facility], [place], and [place]. [facility] locations include [place] and the [facility] in [place].  Benefits of 3D mammograms include: * Improved rate of cancer detection * Decreases your chance of having to go back for more tests, which means fewer: *  "\"False-positive\" results (This means that there is an abnormal area but it isn't cancer.) * Invasive testing procedures, such as a biopsy or surgery * Can provide clearer images of the breast if you have dense breast tissue.  *3D mammography is an optional exam that anyone can have with a 2D mammogram. It doesn't replace or take the place of a 2D mammogram. 2D mammograms remain an effective screening test for all women.  Not all insurance companies cover the cost of a 3D mammogram. Check with your insurance.            Dec 12, 2018  9:30 AM CST   US BREAST LEFT LIMITED 1-3 QUAD with UCBCUS2   [facility] ([email or web address])    [address]  [facility] [address] [phone]           How do I prepare for my exam? (Food and drink instructions) No Food and Drink Restrictions.  How do I prepare for my exam? (Other instructions) You do not need to do anything special to prepare for your exam.  What should I wear: Wear comfortable clothes.  How long does the exam take: Most ultrasounds take 30 to 60 minutes.  What should I bring: Bring a list of your medicines, including vitamins, minerals and over-the-counter drugs. It is safest to leave personal items at home.  Do I need a :  No  is needed.  What do I need to tell my doctor: Tell your doctor about any allergies you may have.  What should I do after the exam: No restrictions, You may resume normal activities.  What is this test: An ultrasound uses sound waves to make pictures of the body. Sound waves do not cause pain. The only discomfort may be the pressure of the wand against your skin or full bladder.  Who should I call with questions: If you have any questions, please call the Imaging Department where you will have your exam. Directions, parking instructions, and other information is available on our website, [email or web address].              Who to contact     Please call your clinic at " "[phone] to:    Ask questions about your health    Make or cancel appointments    Discuss your medicines    Learn about your test results    Speak to your doctor            Additional Information About Your Visit        "[employer] Information     [employer] gives you secure access to your electronic health record. If you see a primary care provider, you can also send messages to your care team and make appointments. If you have questions, please call your primary care clinic.  If you do not have a primary care provider, please call [phone] and they will assist you.      [employer] is an electronic gateway that provides easy, online access to your medical records. With [employer], you can request a clinic appointment, read your test results, renew a prescription or communicate with your care team.     To access your existing account, please contact your [facility] or call [phone] for assistance.        Care EveryWhere ID     This is your Care EveryWhere ID. This could be used by other organizations to access your [facility] medical records  [id number]        Your Vitals Were     Pulse Height Pulse Oximetry BMI (Body Mass Index)          87 1.74 m (5' 8.5\") 98% 41.89 kg/m2         Blood Pressure from Last 3 Encounters:   11/27/18 (!) 133/98   10/31/18 128/82   06/04/18 126/78    Weight from Last 3 Encounters:   11/27/18 126.8 kg (279 lb 9.6 oz)   10/31/18 126.6 kg (279 lb)   04/18/18 122.7 kg (270 lb 6.4 oz)              Today, you had the following     No orders found for display         Today's Medication Changes          These changes are accurate as of 11/27/18 11:41 AM.  If you have any questions, ask your nurse or doctor.               Start taking these medicines.        Dose/Directions    phentermine 37.5 MG tablet   Commonly known as:  ADIPEX-P   Used for:  Morbid obesity (H)   Started by:  Amelia Rico MD        Dose:  18.75 mg   Take 0.5 tablets (18.75 mg) by " mouth every morning (before breakfast)   Quantity:  30 tablet   Refills:  1       topiramate 25 MG tablet   Commonly known as:  TOPAMAX   Used for:  Morbid obesity (H)   Started by:  Amelia Rico MD        25 mg at bedtime for 1 week, 50 mg at bedtime for 1 week and 75 mg daily at bedtime thereafter   Quantity:  270 tablet   Refills:  1            Where to get your medicines      These medications were sent to [facility]/pharmacy #[id number] - [place], [place] - [address], [place] AT [facility]  [address] [facility] [address]     Phone:  [phone]     topiramate 25 MG tablet         Some of these will need a paper prescription and others can be bought over the counter.  Ask your nurse if you have questions.     Bring a paper prescription for each of these medications     phentermine 37.5 MG tablet                Primary Care Provider Office Phone # Fax #    Angelica LopezRAMONA reilly [phone] [address]  [facility] [place] [address]        Equal Access to Services     ROBIN ARGUELLO : Hadii preeti ku hadasho Soomaali, waaxda luqadaha, qaybta kaalmada adeegyada, waxay alberto haybenoit nance . So [facility] [phone].    ATENCIÓN: Si habla español, tiene a pierson disposición servicios gratuitos de asistencia lingüística. Llame al [phone].    We comply with applicable federal civil rights laws and [place] laws. We do not discriminate on the basis of race, color, national origin, age, disability, sex, sexual orientation, or gender identity.            Thank you!     Thank you for choosing [facility]NAG[facility]  for your care. Our goal is always to provide you with excellent care. Hearing back from our patients is one way we can continue to improve our services. Please take a few minutes to complete the written survey that you may receive in the mail after your visit with us. Thank you!             Your Updated Medication List - Protect others around  you: Learn how to safely use, store and throw away your medicines at www.disposemymeds.org.          This list is accurate as of 11/27/18 11:41 AM.  Always use your most recent med list.                   Brand Name Dispense Instructions for use Diagnosis    albuterol 108 (90 Base) MCG/ACT inhaler    PROAIR HFA/PROVENTIL HFA/VENTOLIN HFA    1 Inhaler    Inhale 2 puffs into the lungs every 6 hours as needed for shortness of breath / dyspnea or wheezing    SOB (shortness of breath)       phentermine 37.5 MG tablet    ADIPEX-P    30 tablet    Take 0.5 tablets (18.75 mg) by mouth every morning (before breakfast)    Morbid obesity (H)       topiramate 25 MG tablet    TOPAMAX    270 tablet    25 mg at bedtime for 1 week, 50 mg at bedtime for 1 week and 75 mg daily at bedtime thereafter    Morbid obesity (H)

## 2018-11-27 NOTE — NURSING NOTE
"  Chief Complaint   Patient presents with     Weight Problem     NMWM     Vitals:    11/27/18 1116   BP: (!) 133/98   Pulse: 87   SpO2: 98%   Weight: 279 lb 9.6 oz   Height: 5' 8.5\"     Body mass index is 41.89 kg/(m^2).  Adilene Wells CMA    "

## 2018-11-27 NOTE — PATIENT INSTRUCTIONS
"Nutrition Goals  1) Eat three meals per day   -protein at each meal  2) Increase activity as able, use the treadmill twice per week    Healthy Recipe Resources:  \"The Volumetrics Eating Plan\" by Damari Barnhart, Ph.D.  www.Settle.com  [email or web address] [email or web address]  www.old[employer]actraq Onlinept.org  Dash Diet Recipes [facility]    *Protein Shake Criteria: no more than 210 Calories, at least 20 grams of protein, and less than 10 grams of sugar     Meal Replacement Shake Options:   [facility] smoothie (160 Calories, 20 g protein)   Premier Protein (160 Calories, 30 g protein)  Slim Fast Advanced Nutrition (180 Calories, 20 g protein)  Muscle Milk, lactose-free, 17 oz bottle (210 Calories, 30 g protein)  Integrated Supplements, no artificial sugars (110 Calories, 20 g protein)    Follow up with RD in one-two months    Sabrina Alexander RD, LD  If you need to schedule or reschedule with a dietitian please call [phone].   "

## 2018-11-27 NOTE — PATIENT INSTRUCTIONS
Welcome to our weight management program!   We are excited to join you on your weight loss journey    Thank you for allowing us the privilege of caring for you. We hope we provided you with the excellent service you deserve.    Please let us know if there is anything else we can do so that we can be sure you are leaving completely satisfied with your care experience.    You saw Dr. Amelia HIGGINBOTHAM today.    Instructions per today's visit:   Medications started today: Phentermine and Topiramate      Follow up appointments:  See Dr. Cisneros in 3 months at [place] location-call to schedule [phone]  Dietitian today or in near future    To reach KAYLEY Magallanes for any questions/concerns call [phone]    To schedule appointments with our team, please call [phone]     Please call during clinic hours Monday through Friday 8:00a - 4:00p if you have questions or you can contact us via [employer] at anytime. ?    Fax: [phone]    Thank you,  Medical Weight Management Team          MEDICATION STARTED AT THIS APPOINTMENT    We are starting Phentermine. Take one tablet in the morning.  Call the nurse at [phone] if you have any questions or concerns. (Do not stop taking it if you don't think it's working. For some people it works without them knowing it.)    Phentermine is being prescribed because you identified hunger as one of the main causes for your extra weight.      Our patients on Phentermine find that they:    >feel less hunger    >find it easier to push the plate away   >have an easier time eating less    For some of our patients, these feelings are very real and immediate. For other patients, the feelings are less obvious. They don't feel much of a change but find they've lost weight. Like all weight loss medications, Phentermine  works best when you help it work. This means:  1. Having less tempting high calorie (fattening) food around the house or office. (For people with strong cravings this is very  important.)   2. Staying away from situations or people that may trigger your cravings .   3. Eating out only one time or less each week.  4. Eating your meals at a table with the TV or computer off.    Side-effects. Phentermine is generally well tolerated. The main side-effects we see are feelings of racing pulse or rapid heart beat. Some people can get an elevated blood pressure. Because of this we may have you come back within a week or so of starting the medication for a blood pressure check.         In order to get refills of this or any medication we prescribe you must be seen in the medical weight mgmt clinic every 2-3 months. Please have your pharmacy fax a refill request to [phone].      MEDICATION STARTED AT THIS APPOINTMENT  We are starting topiramate at bedtime.  Start one tab, 25 mg, for a week. Go up to 50 mg (2 tabs) for the next week. At the third week, take   3 tabs (75 mg).  Stay at 3 tabs until you are seen again. Call the nurse at [phone] if you have any questions or concerns. (Do not stop taking it if you don't think it's working. For some people it works even though they do not feel much different.)    Topiramate (Topamax) is a medication that is used most often to treat migraine headaches or for seizures. It has also been found to help with weight loss. Although it's not currently FDA approved for weight loss, it has been used safely for a number of years to help people who are carrying extra weight.     Just how topiramate helps with weight loss has not been exactly determined. However it seems to work on areas of the brain to quiet down signals related to eating.      Topiramate may make you:    >feel less interest in eating in between meals   >think less about food and eating   >find it easier to push the plate away   >find giving up pop easier    >have an easier time eating less    For some of our patients, the pills work right away. They feel and think quite differently about  food. Other patients don't feel much of a change but find in fact they have lost weight! Like all weight loss medications, topiramate works best when you help it work.  This means:    1) Have less tempting high calorie (fattening) food around the house or office    2) Have lower calorie food (fruits, vegetables,low fat meats and dairy) for snacks    3) Eat out only one time or less each week.   4) Eat your meals at a table with the TV or computer off.    Side-effects. Topiramate is generally well tolerated. The main side-effects we see are:   Tingling in hands,feet, or face (usually not very troublesome)   Mental confusion and word finding trouble (about 10% of patients have this.)     Feeling sleepy or a bit dopey- this goes away very soon after starting.    One of the dangers of topiramate is the possibility of birth defects--if you get pregnant when you are on it, there is the risk that your baby will be born with a cleft lip or palate.  If you are on topiramate and of child bearing age, you need to be on a reliable form of birth control or refrain from sexual intercourse.     Please refer to the pharmacy insert for more information on side-effects. Since many pharmacists are not familiar with the use of topiramate in weight loss, calling the clinic will get you the most accurate information on the use of this medication for weight loss.     In order to get refills of this or any medication we prescribe you must be seen in the medical weight mgmt clinic every 2-3 months. Please have your pharmacy fax a refill request to [phone].

## 2018-11-27 NOTE — PROGRESS NOTES
"        New Medical Weight Management Consult    PATIENT:  Minerva Andrade  MRN:         [id number]  :         1975  MAGGIE:         2018    Dear Angelica Santos,    I had the pleasure of seeing your patient, Minerva Andrade.  Full intake/assessment done to determine barriers to weight loss success and develop a treatment plan.  Minerva Andrade is a 43 year old female interested in treatment of medical problems associated with weight.  Her weight today is 279 lbs 9.6 oz, Body mass index is 41.89 kg/(m^2)., and she has the following co-morbidities: hypertension      Patient Goals Reviewed With Patient 2018   I am interested in attaining a healthier weight in order to prevent future health problems: No       Referring Provider 2018   Please name the provider who referred you to Medical Weight Management.  If you do not know, please answer: \"I Don't Know\". Citlalli Almaraz       She noticed that she gained weight about 40-50 lbs over the past 3 years after she gave birth and also placed on IUD. She said that her appetite has increased and she might not be mindful eating. She has never needed to watch her diet before. IUD was removed 1 month ago. She reports eating out on the weekend at sit-down restaurant such as [employer] or [employer]. She usually cooks on the weekday.     Diet recalled:  BF: egg, landa, bread  Lunch and dinner: meat and rice  Snack: rarely    Work: customer service -- sitting all day    Exercise: none    Weight History Reviewed With Patient 2018   How concerned are you about your weight? Somewhat Concerned   Would you describe your weight gain as gradual? Yes   I became overweight: After Pregnancy   The following factors have contributed to my weight gain:  Lack of Exercise, Genetic (Runs in the Family)   I have tried the following methods to lose weight: Weight Loss Surgery   I have the following family history of obesity/being overweight:  Many of my relatives are " overweight   Has anyone in your family had weight loss surgery? No       Diet Recall Reviewed With Patient 11/20/2018   How many glasses of juice do you drink in a typical day? 1   How many of glasses of milk do you drink in a typical day? 1   How many 8oz glasses of sugar containing drinks such as Garrett-Aid/sweet tea do you drink in a day? 0   How many cans/bottles of sugar pop/soda/tea/sports drinks do you drink in a day? 0   How many cans/bottles of diet pop/soda/tea or sports drink do you drink in a day? 0   How often do you have a drink of alcohol? 2-4 Times a Month   If you do drink, how many drinks might you have in a day? 1 or 2       Eating Habits Reviewed With Patient 11/20/2018   I eat when I am depressed, stressed, anxious, or bored. Never   I eat when I am happy or as a reward. Never   I feel hungry all the time even if I just have eaten. Half of the Week   Feeling full is important to me. Half of the Week   Once I start eating, it is hard to stop. Never   I finish all the food on my plate even if I am already full. Never   I can't resist eating delicious food or walk past the good food/smell. Half of the Week   I eat/snack without noticing that I am eating. A Few Times a Week   I eat when I am preparing the meal. Never   I eat more than usual when I see others eating. Never   I have trouble not eating sweets, ice cream, cookies, or chips if they are around the house. Never   I think about food all day. Never   Please list any other foods you crave? Pies   I feel out of control when eating. Never   I eat a large amount of food, like a loaf of bread, a box of cookies, a pint/quart of ice cream, all at once. Never   I eat a large amount of food even when I am not hungry. Never   I eat rapidly. Never   I eat alone because I feel embarrassed and do not want others to see how much I have eaten. Never   I eat until I am uncomfortably full. Never   I feel bad, disgusted, or guilty after I overeat. Weekly   I  make myself vomit what I have eaten or use laxatives to get rid of food. Never       Activity/Exercise History Reviewed With Patient 11/20/2018   How much of a typical 12 hour day do you spend sitting? Most of the Day   How much of a typical 12 hour day do you spend lying down? Less Than Half the Day   How much of a typical day do you spend walking/standing? Less Than Half the Day   How many hours (not including work) do you spend on the TV/Video Games/Computer/Tablet/Phone? 1 Hour or Less   How many times a week are you active for the purpose of exercise? Never   How many total minutes do you spend doing some activity for the purpose of exercising when you exercise? None   What keeps you from being more active? Lack of Time, Too tired       PAST MEDICAL HISTORY:  Past Medical History:   Diagnosis Date     NO ACTIVE PROBLEMS (aka NONE)        Work/Social History Reviewed With Patient 11/20/2018   My employment status is: Full-Time   How much of your job is spent on the computer or phone? 100%   What is your marital status? /In a Relationship   If in a relationship, is your significant other overweight? Yes   Do you have children? Yes   If you have children, are they overweight? No       Mental Health History Reviewed With Patient 11/20/2018   Have you ever been physically or sexually abused? No   How often in the past 2 weeks have you felt little interest or pleasure in doing things? Not at all   Over the past 2 weeks how often have you felt down, depressed, or hopeless? Not at all       Sleep History Reviewed With Patient 11/20/2018   How many hours do you sleep at night? 6   Do you think that you snore loudly or has anybody ever heard you snore loudly (louder than talking or so loud it can be heard behind a shut door)? Yes   Has anyone seen or heard you stop breathing during your sleep? No   Do you often feel tired, fatigued, or sleepy during the day? Yes       MEDICATIONS:   Current Outpatient  "Prescriptions   Medication Sig Dispense Refill     albuterol (PROAIR HFA/PROVENTIL HFA/VENTOLIN HFA) 108 (90 Base) MCG/ACT Inhaler Inhale 2 puffs into the lungs every 6 hours as needed for shortness of breath / dyspnea or wheezing 1 Inhaler 1       ALLERGIES:   No Known Allergies    PHYSICAL EXAM:  BP (!) 133/98  Pulse 87  Ht 1.74 m (5' 8.5\")  Wt 126.8 kg (279 lb 9.6 oz)  SpO2 98%  BMI 41.89 kg/m2   A & O x 3  HEENT: NCAT, mucous membranes moist  Respirations unlabored  Location of obesity: Mixed Obesity    ASSESSMENT:  Minerva is a patient with mature onset obesity with significant element of familial/genetic influence and with current health consequences. She does not need aggressive weight loss plan.  Minerva Andrade eats a high carb diet, eats a high fat diet, has perception of intense hunger, eats to obtain specific degree of fullness and mostly eats during the evening.    Her problem is complicated by strong craving/reward pathways and poor lifestyle choices    Her ability to lose weight is impacted by current work life and lack of confidence.    PLAN:    Decrease portion sizes  Decrease eating out  Purge house of food triggers  Dietician visit of education  Calorie/low fat diet  Meal planning  Exercise as tolerated    Craving/Reward   Ancillary testing:  N/A.  Food Plan:  High protein/low carbohydrate.   Activity Plan:  Exercise after meals.  Supplementary:  N/A.   Medication:  The patient will begin medication in pursuit of improved medical status as influenced by body weight. She will start phentermine 18.75 mg daily and topiramate titration from 25 mg up to 75 mg daily.  There is a mutual understanding of the goals and risks of this therapy. The patient is in agreement. She is educated on dosage regimen and possible side effects.      RTC:    Dietitian visit today  3 months follow up with me.    TIME: 30 min spent on evaluation, management, counseling, education, & motivational interviewing with " greater than 50 % of the total time was spent on counseling and coordinating care    Sincerely,    Amelia Rico MD

## 2018-11-27 NOTE — LETTER
"2018       RE: Minerva Andrade  [address]   [address]  [place] [address]     Dear Colleague,    Thank you for referring your patient, Minerva Andrade, to the [facility]NA[facility]. Please see a copy of my visit note below.            New Medical Weight Management Consult    PATIENT:  Minerva Andrade  MRN:         [id number]  :         1975  MAGGIE:         2018    Dear Angelica Santos,    I had the pleasure of seeing your patient, Minerva Andrade.  Full intake/assessment done to determine barriers to weight loss success and develop a treatment plan.  Minerva Andrade is a 43 year old female interested in treatment of medical problems associated with weight.  Her weight today is 279 lbs 9.6 oz, Body mass index is 41.89 kg/(m^2)., and she has the following co-morbidities: hypertension      Patient Goals Reviewed With Patient 2018   I am interested in attaining a healthier weight in order to prevent future health problems: No       Referring Provider 2018   Please name the provider who referred you to Medical Weight Management.  If you do not know, please answer: \"I Don't Know\". Citlalli Almaraz       She noticed that she gained weight about 40-50 lbs over the past 3 years after she gave birth and also placed on IUD. She said that her appetite has increased and she might not be mindful eating. She has never needed to watch her diet before. IUD was removed 1 month ago. She reports eating out on the weekend at sit-down restaurant such as [employer] or [employer]. She usually cooks on the weekday.     Diet recalled:  BF: egg, landa, bread  Lunch and dinner: meat and rice  Snack: rarely    Work: customer service -- sitting all day    Exercise: none    Weight History Reviewed With Patient 2018   How concerned are you about your weight? Somewhat Concerned   Would you describe your weight gain as gradual? Yes   I became " overweight: After Pregnancy   The following factors have contributed to my weight gain:  Lack of Exercise, Genetic (Runs in the Family)   I have tried the following methods to lose weight: Weight Loss Surgery   I have the following family history of obesity/being overweight:  Many of my relatives are overweight   Has anyone in your family had weight loss surgery? No       Diet Recall Reviewed With Patient 11/20/2018   How many glasses of juice do you drink in a typical day? 1   How many of glasses of milk do you drink in a typical day? 1   How many 8oz glasses of sugar containing drinks such as Garrett-Aid/sweet tea do you drink in a day? 0   How many cans/bottles of sugar pop/soda/tea/sports drinks do you drink in a day? 0   How many cans/bottles of diet pop/soda/tea or sports drink do you drink in a day? 0   How often do you have a drink of alcohol? 2-4 Times a Month   If you do drink, how many drinks might you have in a day? 1 or 2       Eating Habits Reviewed With Patient 11/20/2018   I eat when I am depressed, stressed, anxious, or bored. Never   I eat when I am happy or as a reward. Never   I feel hungry all the time even if I just have eaten. Half of the Week   Feeling full is important to me. Half of the Week   Once I start eating, it is hard to stop. Never   I finish all the food on my plate even if I am already full. Never   I can't resist eating delicious food or walk past the good food/smell. Half of the Week   I eat/snack without noticing that I am eating. A Few Times a Week   I eat when I am preparing the meal. Never   I eat more than usual when I see others eating. Never   I have trouble not eating sweets, ice cream, cookies, or chips if they are around the house. Never   I think about food all day. Never   Please list any other foods you crave? Pies   I feel out of control when eating. Never   I eat a large amount of food, like a loaf of bread, a box of cookies, a pint/quart of ice cream, all at once.  Never   I eat a large amount of food even when I am not hungry. Never   I eat rapidly. Never   I eat alone because I feel embarrassed and do not want others to see how much I have eaten. Never   I eat until I am uncomfortably full. Never   I feel bad, disgusted, or guilty after I overeat. Weekly   I make myself vomit what I have eaten or use laxatives to get rid of food. Never       Activity/Exercise History Reviewed With Patient 11/20/2018   How much of a typical 12 hour day do you spend sitting? Most of the Day   How much of a typical 12 hour day do you spend lying down? Less Than Half the Day   How much of a typical day do you spend walking/standing? Less Than Half the Day   How many hours (not including work) do you spend on the TV/Video Games/Computer/Tablet/Phone? 1 Hour or Less   How many times a week are you active for the purpose of exercise? Never   How many total minutes do you spend doing some activity for the purpose of exercising when you exercise? None   What keeps you from being more active? Lack of Time, Too tired       PAST MEDICAL HISTORY:  Past Medical History:   Diagnosis Date     NO ACTIVE PROBLEMS (aka NONE)        Work/Social History Reviewed With Patient 11/20/2018   My employment status is: Full-Time   How much of your job is spent on the computer or phone? 100%   What is your marital status? /In a Relationship   If in a relationship, is your significant other overweight? Yes   Do you have children? Yes   If you have children, are they overweight? No       Mental Health History Reviewed With Patient 11/20/2018   Have you ever been physically or sexually abused? No   How often in the past 2 weeks have you felt little interest or pleasure in doing things? Not at all   Over the past 2 weeks how often have you felt down, depressed, or hopeless? Not at all       Sleep History Reviewed With Patient 11/20/2018   How many hours do you sleep at night? 6   Do you think that you snore loudly or  "has anybody ever heard you snore loudly (louder than talking or so loud it can be heard behind a shut door)? Yes   Has anyone seen or heard you stop breathing during your sleep? No   Do you often feel tired, fatigued, or sleepy during the day? Yes       MEDICATIONS:   Current Outpatient Prescriptions   Medication Sig Dispense Refill     albuterol (PROAIR HFA/PROVENTIL HFA/VENTOLIN HFA) 108 (90 Base) MCG/ACT Inhaler Inhale 2 puffs into the lungs every 6 hours as needed for shortness of breath / dyspnea or wheezing 1 Inhaler 1       ALLERGIES:   No Known Allergies    PHYSICAL EXAM:  BP (!) 133/98  Pulse 87  Ht 1.74 m (5' 8.5\")  Wt 126.8 kg (279 lb 9.6 oz)  SpO2 98%  BMI 41.89 kg/m2   A & O x 3  HEENT: NCAT, mucous membranes moist  Respirations unlabored  Location of obesity: Mixed Obesity    ASSESSMENT:  Minerva is a patient with mature onset obesity with significant element of familial/genetic influence and with current health consequences. She does not need aggressive weight loss plan.  Minerva Andrade eats a high carb diet, eats a high fat diet, has perception of intense hunger, eats to obtain specific degree of fullness and mostly eats during the evening.    Her problem is complicated by strong craving/reward pathways and poor lifestyle choices    Her ability to lose weight is impacted by current work life and lack of confidence.    PLAN:    Decrease portion sizes  Decrease eating out  Purge house of food triggers  Dietician visit of education  Calorie/low fat diet  Meal planning  Exercise as tolerated    Craving/Reward   Ancillary testing:  N/A.  Food Plan:  High protein/low carbohydrate.   Activity Plan:  Exercise after meals.  Supplementary:  N/A.   Medication:  The patient will begin medication in pursuit of improved medical status as influenced by body weight. She will start phentermine 18.75 mg daily and topiramate titration from 25 mg up to 75 mg daily.  There is a mutual understanding of the goals and " risks of this therapy. The patient is in agreement. She is educated on dosage regimen and possible side effects.      RTC:    Dietitian visit today  3 months follow up with me.    TIME: 30 min spent on evaluation, management, counseling, education, & motivational interviewing with greater than 50 % of the total time was spent on counseling and coordinating care    Sincerely,    Amelia Rico MD

## 2018-11-27 NOTE — MR AVS SNAPSHOT
MRN:[id number]                      After Visit Summary   11/27/2018    Minerva Andrade    MRN: [id number]           Visit Information        Provider Department      11/27/2018 12:00 PM Sabrina Alexander RD [facility] Surgical Weight Management        Your next 10 appointments already scheduled     Dec 12, 2018  9:00 AM CST   (Arrive by 4:00 AM)   MA DIAGNOSTIC DIGITAL LEFT with UCBCMA2   [facility] ([facility])    [address] [place] [address], 2nd Floor  [facility] [address] [phone]           How do I prepare for my exam? (Food and drink instructions) No Food and Drink Restrictions.  How do I prepare for my exam? (Other instructions) Do not use any powder, lotion or deodorant under your arms or on your breast. If you do, we will ask you to remove it before your exam.  What should I wear: Wear comfortable, two-piece clothing.  How long does the exam take: Most scans will take 15 minutes.  What should I bring: Bring any previous mammograms from other facilities or have them mailed to the breast center.  Do I need a :  No  is needed.  What do I need to tell my doctor: If you have any allergies, tell your care team.  What should I do after the exam: No restrictions, You may resume normal activities.  What is this test: This test is an x-ray of the breast to look for breast disease. The breast is pressed between two plates to flatten and spread the tissue. An X-ray is taken of the breast from different angles.  Who should I call with questions: If you have any questions, please call the Imaging Department where you will have your exam. Directions, parking instructions, and other information is available on our website, [email or web address].  Other information about my exam Three-dimensional (3D) mammograms are available at [place] locations in [facility], [place], and [place]. [facility]  "include [place] and [facility] [place] in [place].  Benefits of 3D mammograms include: * Improved rate of cancer detection * Decreases your chance of having to go back for more tests, which means fewer: * \"False-positive\" results (This means that there is an abnormal area but it isn't cancer.) * Invasive testing procedures, such as a biopsy or surgery * Can provide clearer images of the breast if you have dense breast tissue.  *3D mammography is an optional exam that anyone can have with a 2D mammogram. It doesn't replace or take the place of a 2D mammogram. 2D mammograms remain an effective screening test for all women.  Not all insurance companies cover the cost of a 3D mammogram. Check with your insurance.            Dec 12, 2018  9:30 AM CST   US BREAST LEFT LIMITED 1-3 QUAD with UCBCUS2   [facility] ([facility])    [address] [facility] [address], 2nd Floor  [facility] [address] [phone]           How do I prepare for my exam? (Food and drink instructions) No Food and Drink Restrictions.  How do I prepare for my exam? (Other instructions) You do not need to do anything special to prepare for your exam.  What should I wear: Wear comfortable clothes.  How long does the exam take: Most ultrasounds take 30 to 60 minutes.  What should I bring: Bring a list of your medicines, including vitamins, minerals and over-the-counter drugs. It is safest to leave personal items at home.  Do I need a :  No  is needed.  What do I need to tell my doctor: Tell your doctor about any allergies you may have.  What should I do after the exam: No restrictions, You may resume normal activities.  What is this test: An ultrasound uses sound waves to make pictures of the body. Sound waves do not cause pain. The only discomfort may be the pressure of the wand against your skin or full bladder.  Who should I call with questions: If you have any questions, please " "call the Imaging Department where you will have your exam. Directions, parking instructions, and other information is available on our website, [email or web address].              Care Instructions    Nutrition Goals  1) Eat three meals per day   -protein at each meal  2) Increase activity as able, use the treadmill twice per week    Healthy Recipe Resources:  \"The Volumetrics Eating Plan\" by Damari Barnhart, Ph.D.  www.Quick TV.com  www.hungryElm City Market Communitygirl.[email or web address] MOBILE  www.[email or web address].org  Dash Diet Recipes [facility]    *Protein Shake Criteria: no more than 210 Calories, at least 20 grams of protein, and less than 10 grams of sugar     Meal Replacement Shake Options:   firstSTRE[employer] [facility] smoothie (160 Calories, 20 g protein)   Premier Protein (160 Calories, 30 g protein)  Slim Fast Advanced Nutrition (180 Calories, 20 g protein)  Muscle Milk, lactose-free, 17 oz bottle (210 Calories, 30 g protein)  Integrated Supplements, no artificial sugars (110 Calories, 20 g protein)    Follow up with RD in one-two months    Sabrina Alexander RD, MATT  If you need to schedule or reschedule with a dietitian please call [phone].          LiveData Information     LiveData gives you secure access to your electronic health record. If you see a primary care provider, you can also send messages to your care team and make appointments. If you have questions, please call your primary care clinic.  If you do not have a primary care provider, please call [phone] and they will assist you.      LiveData is an electronic gateway that provides easy, online access to your medical records. With LiveData, you can request a clinic appointment, read your test results, renew a prescription or communicate with your care team.     To access your existing account, please contact your [facility] or call [phone] for assistance.        Care EveryWhere ID     This is your Care EveryWhere ID. This could be used by other organizations to " access your [facility] medical records  [id number]        Equal Access to Services     DYLON ARGUELLO : Laura Mcallister, sania sheets, nolan gutierrez, emmanuel haines. So [facility] [phone].    ATENCIÓN: Si habla español, tiene a pierson disposición servicios gratuitos de asistencia lingüística. Llame al [phone].    We comply with applicable federal civil rights laws and [place] laws. We do not discriminate on the basis of race, color, national origin, age, disability, sex, sexual orientation, or gender identity.

## 2018-11-28 ENCOUNTER — TELEPHONE (OUTPATIENT)
Dept: ENDOCRINOLOGY | Facility: CLINIC | Age: 43
End: 2018-11-28

## 2018-11-28 NOTE — TELEPHONE ENCOUNTER
Attempted to call patient, left message on machine for patient to call back to make an appt.     Chana Cummings [facility]  Adult Endocrinology  [facility]

## 2018-11-28 NOTE — TELEPHONE ENCOUNTER
----- Message from Amelia Rico MD sent at 11/27/2018 12:59 PM CST -----  Regarding: schedule pt for f/u weight management  Hi,    I saw at [facility] at the . She lives in  area so would like to see me for follow up weight management in 3 months. Could you call her?    Thanks,  Amelia

## 2018-12-12 ENCOUNTER — ANCILLARY PROCEDURE (OUTPATIENT)
Dept: MAMMOGRAPHY | Facility: CLINIC | Age: 43
End: 2018-12-12
Attending: PHYSICIAN ASSISTANT
Payer: COMMERCIAL

## 2018-12-12 DIAGNOSIS — R92.8 ABNORMAL MAMMOGRAM OF LEFT BREAST: ICD-10-CM

## 2018-12-12 NOTE — LETTER
Minerva Andrade  [address]     [facility] [place] [address]      December 12, 2018    Date of Exam:       Dear Minerva:    Thank you for your recent visit.    Result: Needs Biopsy. Based on your recent breast imaging, you have a suspicious area that usually requires a biopsy, at which time a small tissue sample would be taken from your breast.      Your breast imaging will become part of your medical file here at [facility] for at least 10 years. You are responsible for informing any new health care provider or mammography facility of the date and location of this examination. A report of your breast imaging results was sent to the health care provider you indicated.    We appreciate the opportunity to participate in your health care.    Sincerely,    Dr. Kellen Duncan  Interpreting Radiologist  [facility] [place] [facility]

## 2018-12-12 NOTE — RESULT ENCOUNTER NOTE
Nick Palma  Here are your results for your review.  I do see that you have an upcoming appointment with them.   Let me know if there is anything that I can do.   Please call or MyChart my office with any questions or concerns.    Citlalli Montaño, PAC

## 2018-12-20 ENCOUNTER — ANCILLARY PROCEDURE (OUTPATIENT)
Dept: MAMMOGRAPHY | Facility: CLINIC | Age: 43
End: 2018-12-20
Attending: PHYSICIAN ASSISTANT
Payer: COMMERCIAL

## 2018-12-20 DIAGNOSIS — R92.8 ABNORMAL MAMMOGRAM OF LEFT BREAST: ICD-10-CM

## 2018-12-20 RX ORDER — LIDOCAINE HYDROCHLORIDE 10 MG/ML
10 INJECTION, SOLUTION EPIDURAL; INFILTRATION; INTRACAUDAL; PERINEURAL ONCE
Status: COMPLETED | OUTPATIENT
Start: 2018-12-20 | End: 2018-12-20

## 2018-12-20 RX ORDER — IOPAMIDOL 612 MG/ML
100 INJECTION, SOLUTION INTRAVASCULAR ONCE
Status: COMPLETED | OUTPATIENT
Start: 2018-12-20 | End: 2018-12-20

## 2018-12-20 RX ORDER — LIDOCAINE HYDROCHLORIDE AND EPINEPHRINE 10; 10 MG/ML; UG/ML
10 INJECTION, SOLUTION INFILTRATION; PERINEURAL ONCE
Status: COMPLETED | OUTPATIENT
Start: 2018-12-20 | End: 2018-12-20

## 2018-12-20 RX ADMIN — LIDOCAINE HYDROCHLORIDE 10 ML: 10 INJECTION, SOLUTION EPIDURAL; INFILTRATION; INTRACAUDAL; PERINEURAL at 13:45

## 2018-12-20 RX ADMIN — IOPAMIDOL 100 ML: 612 INJECTION, SOLUTION INTRAVASCULAR at 13:19

## 2018-12-20 RX ADMIN — LIDOCAINE HYDROCHLORIDE AND EPINEPHRINE 10 ML: 10; 10 INJECTION, SOLUTION INFILTRATION; PERINEURAL at 13:45

## 2018-12-20 NOTE — RESULT ENCOUNTER NOTE
Nick Palma  I see that you had a procedure today.  Here are your results.  I believe the radiologist went over it with you.   Please call or MyChart my office with any questions or concerns.    Citlalli Montaño, PAC

## 2018-12-24 LAB — COPATH REPORT: NORMAL

## 2018-12-26 ENCOUNTER — TELEPHONE (OUTPATIENT)
Dept: MAMMOGRAPHY | Facility: CLINIC | Age: 43
End: 2018-12-26

## 2019-02-05 NOTE — TELEPHONE ENCOUNTER
Attempted to call patient, no answer, left message for patient to call back to make appointment. Will send letter with number as well.    Chana Cummings [facility]  Adult Endocrinology  [facility]

## 2019-03-14 NOTE — PROGRESS NOTES
Nikc Palma  Your mammogram had an abnormality and some additional imaging is needed.   Please call [phone] to schedule.   Please call or MyChart my office with any questions or concerns.    Citlalli Montaño, PAC       No

## 2019-11-19 ENCOUNTER — MYC MEDICAL ADVICE (OUTPATIENT)
Dept: FAMILY MEDICINE | Facility: CLINIC | Age: 44
End: 2019-11-19

## 2019-12-24 ENCOUNTER — DOCUMENTATION ONLY (OUTPATIENT)
Dept: FAMILY MEDICINE | Facility: CLINIC | Age: 44
End: 2019-12-24

## 2019-12-24 NOTE — PROGRESS NOTES
This patient has overdue labs. A [employer] message was sent on 11/19/2019 and there has been no lab appointment made. If you still want these labs done, please have your care team contact the patient to make a lab appointment. Otherwise, please have the labs discontinued and close the encounter.    Thank you,    Wendi Owens MLT([facility])  [facility]

## 2020-03-02 ENCOUNTER — HEALTH MAINTENANCE LETTER (OUTPATIENT)
Age: 45
End: 2020-03-02

## 2020-07-23 ENCOUNTER — VIRTUAL VISIT (OUTPATIENT)
Dept: FAMILY MEDICINE | Facility: CLINIC | Age: 45
End: 2020-07-23

## 2020-07-23 DIAGNOSIS — M25.472 SWELLING OF BOTH ANKLES: Primary | ICD-10-CM

## 2020-07-23 DIAGNOSIS — I87.2 VENOUS INSUFFICIENCY OF BOTH LOWER EXTREMITIES: ICD-10-CM

## 2020-07-23 DIAGNOSIS — R10.11 RUQ ABDOMINAL PAIN: ICD-10-CM

## 2020-07-23 DIAGNOSIS — M25.471 SWELLING OF BOTH ANKLES: Primary | ICD-10-CM

## 2020-07-23 DIAGNOSIS — Z90.49 STATUS POST LAPAROSCOPIC CHOLECYSTECTOMY: ICD-10-CM

## 2020-07-23 PROCEDURE — 99214 OFFICE O/P EST MOD 30 MIN: CPT | Mod: 95 | Performed by: FAMILY MEDICINE

## 2020-07-23 RX ORDER — FUROSEMIDE 20 MG
20 TABLET ORAL DAILY PRN
Qty: 90 TABLET | Refills: 0 | Status: SHIPPED | OUTPATIENT
Start: 2020-07-23 | End: 2023-05-10

## 2020-07-23 RX ORDER — MULTIPLE VITAMINS W/ MINERALS TAB 9MG-400MCG
1 TAB ORAL DAILY
COMMUNITY
End: 2021-05-18

## 2020-07-23 NOTE — PATIENT INSTRUCTIONS
At [facility], we strive to deliver an exceptional experience to you, every time we see you. If you receive a survey, please complete it as we do value your feedback.  If you have MyChart, you can expect to receive results automatically within 24 hours of their completion.  Your provider will send a note interpreting your results as well.   If you do not have MyChart, you should receive your results in about a week by mail.    Your care team:     Family Medicine   NAN Huertas APRN CNP S. Matthew Hockett, MD Pamela Kolacz, MD Angela Wermerskirchen, MD    Internal Medicine  Ricardo Fox MD     Clinic hours: Monday - Wednesday 7 am-7 pm   Thursdays and Fridays 7 am-5 pm.     [facility]: Monday - Friday 11 am-9 pm,   Saturday and Sunday 9 am-5 pm.     [facility]: Monday - Thursday 8 am - 7 pm; Friday 8 am - 6 pm    Clinic: [phone]   [facility]: [phone]     Use www.oncare.org for 24/7 diagnosis and treatment of dozens of conditions.          Patient Education     Understanding Chronic Venous Insufficiency  Problems with the veins in the legs may lead to chronic venous insufficiency (CVI). CVI means that there is a long-term problem with the veins not being able to pump blood back to your heart. When this happens, blood stays in the legs and causes swelling and aching.   Two problems that may lead to chronic venous insufficiency are:    Damaged valves. Valves keep blood flowing from the legs through the blood vessels and back to the heart. When the valves are damaged, blood does not flow as well.     Deep vein thrombosis (DVT). Blood clots may form in the deep veins of the legs. This may cause pain, redness, and swelling in the legs. It may also block the flow of blood back to the heart. Seek medical care right away if you have these symptoms.    A blood clot in the leg can also break off and travel  to the lungs. This is called pulmonary embolism (PE). In the lungs, the clot can cut off the flow of blood. This may cause chest pain, trouble breathing, sweating, a fast heartbeat, coughing (may cough up blood), and fainting. It is a medical emergency and may cause death. Call 911 if you have these symptoms.    Healthcare providers call the two conditions, DVT and PE, venous thromboembolism (VTE).  CVI can t be cured, but you can control leg swelling to reduce the likelihood of ulcers (sores).  Recognizing the symptoms  Be aware of the following:    If you stand or sit with your feet down for long periods, your legs may ache or feel heavy.    Swollen ankles are possibly the most common symptom of CVI.    As swelling increases, the skin over your ankles may show red spots or a brownish tinge. The skin may feel leathery or scaly, and may start to itch.    If swelling is not controlled, an ulcer (open wound) may form.  What you can do  Reduce your risk of developing ulcers by doing the following:    Increase blood flow back to your heart by elevating your legs, exercising daily, and wearing elastic stockings.    Boost blood flow in your legs by losing excess weight.    If you must stand or sit in one place for a period of time, keep your blood moving by wiggling your toes, shifting your body position, and rising up on the balls of your feet.    Date Last Reviewed: 5/1/2016 2000-2019 [employer]. [address] [place] [address]. All rights reserved. This information is not intended as a substitute for professional medical care. Always follow your healthcare professional's instructions.           Patient Education     What Is a Hernia?    A hernia is when an organ or tissue pushes through a weak area in the belly (abdominal) wall. This weak area may be present at birth. Or it may be caused by abdominal strain over time. If not treated, a hernia can get worse with time and physical  stress.  When a bulge forms  When there is a weak area in the abdominal wall, an organ or tissue can push outward. This often causes a bulge that you can see under your skin. The bulge may get bigger when you stand up. It may go away when you lie down. You may also feel some pressure or mild pain when lifting, coughing, urinating, or doing other activities.  Types of hernias  The type of hernia you have depends on its location. Most hernias form in the groin at or near the internal ring. This is the entrance to a canal between the abdomen and groin. Hernias can also occur in the abdomen, thigh, or genitals.    An incisional hernia occurs at the site of a previous surgical incision.    An umbilical hernia occurs at the navel.    An indirect inguinal hernia occurs in the groin at the internal ring.    A direct inguinal hernia occurs in the groin near the internal ring.    A femoral hernia occurs just below the groin.    An epigastric hernia occurs in the upper abdomen at the midline.  Diagnosis  In most cases, your healthcare provider can diagnose a hernia by doing a physical exam.  In some cases it might not be clear why you have a swelling in the belly wall. Then your provider may order an imaging test such as an ultrasound. This can help with the diagnosis.  Surgery  A hernia will not heal on its own. Surgery is needed to fix the weak spot in the abdominal wall. If not treated, a hernia can get larger. It can also cause serious health problems. The good news is that hernia surgery can be done quickly and safely. In some cases, you can go home the same day as your surgery.   When to call your provider  Call your healthcare provider right away if the swelling around your hernia becomes larger, firmer, or more painful. These may be signs that your intestines are stuck in the abdominal wall. This is an emergency. The hernia must be repaired right away to avoid serious problems.  Date Last Reviewed: 7/1/2016     7934-8400 [employer]. [address] [place] [address], [place] [address]. All rights reserved. This information is not intended as a substitute for professional medical care. Always follow your healthcare professional's instructions.

## 2020-07-23 NOTE — PROGRESS NOTES
"Minerva Andrade is a 45 year old female who is being evaluated via a billable video visit.      The patient has been notified of following:     \"This video visit will be conducted via a call between you and your physician/provider. We have found that certain health care needs can be provided without the need for an in-person physical exam.  This service lets us provide the care you need with a video conversation.  If a prescription is necessary we can send it directly to your pharmacy.  If lab work is needed we can place an order for that and you can then stop by our lab to have the test done at a later time.    Video visits are billed at different rates depending on your insurance coverage.  Please reach out to your insurance provider with any questions.    If during the course of the call the physician/provider feels a video visit is not appropriate, you will not be charged for this service.\"    Patient has given verbal consent for Video visit? No  How would you like to obtain your AVS? MyChart  If you are dropped from the video visit, the video invite should be resent to: Text to cell phone: [phone]  Will anyone else be joining your video visit? No    Subjective     Minerva Andrade is a 45 year old female who presents today via video visit for the following health issues:    HPI    Pt has swollen feet for past years she hasn't taken anything for it. Per pt she is doing a follow up     Concern - foot/feet swelling     When did it start?: about 6 years ago    Any strain/injury, other illness, or event to trigger this problem?   no    Previous history of similar problem:   no       Location:           Around ankle area of both feet    Describe it:   Comes and goes, some days worse than others    Severity: mild, moderate      Progression of symptoms from onset until now:  waxing and waning      Accompanying Signs & Symptoms:  occ gets pain (2 or 3 out of 10)   What have you noticed that tends to make this worse? " "    plonged sitting, such as while working      What have you noticed that tends to make this better?     Elevating legs      What have you done (this time) to try to treat this problem yourself?     Sometime she elevates legs in evening      Did it help?     mildly          Abdominal Pain      Duration: since her lap darwin 5/7/18    Description (location/character/radiation): Bilateral above the feet, the lowest part of the lower legs adjacent to the ankle joint region, also involving the ankle joint regions       Associated flank pain: None    Intensity:  mild, moderate    Accompanying signs and symptoms:        Fever/Chills: no        Gas/Bloating: YES- feels swollen ion the same area       Nausea/vomitting: no        Diarrhea: no        Dysuria or Hematuria: no     History (previous similar pain/trauma/previous testing): None    Precipitating or alleviating factors:       Pain worse with eating/BM/urination: yes, eating or dinking anything (incl a glass of water) will make it worse       Pain relieved by BM: no     Therapies tried and outcome: None        Past medical, family, and social histories, medications, and allergies are reviewed and updated in [facility].     Review of Systems   Constitutional, HEENT, cardiovascular, pulmonary, gi and gu systems are negative, except as otherwise noted.      Objective    Vitals - Patient Reported  Weight (Patient Reported): 113.4 kg (250 lb)  Height (Patient Reported): 175.3 cm (5' 9\")  BMI (Based on Pt Reported Ht/Wt): 36.92  Pain Score: Mild Pain (2)  Pain Loc: Foot        Physical Exam     GENERAL: Healthy, alert and no distress  EYES: Eyes grossly normal to inspection.  No discharge or erythema, or obvious scleral/conjunctival abnormalities.  RESP: No audible wheeze, cough, or visible cyanosis.  No visible retractions or increased work of breathing.    SKIN: Visible skin clear. No significant rash, abnormal pigmentation or lesions. Mild swelling of ankles and lower " pretibial areas(RT lateral shown below), apparently non-pitting, but this was difficult to test  NEURO: Cranial nerves grossly intact.  Mentation and speech appropriate for age.  PSYCH: Mentation appears normal, affect normal/bright, judgement and insight intact, normal speech and appearance well-groomed.        =====    ASSESSMENT/PLAN:  (M25.471,  M25.472) Swelling of both ankles  (primary encounter diagnosis)  (I87.2) Venous insufficiency of both lower extremities  Comment: Chronic  Plan: furosemide (LASIX) 20 MG tablet, order for DME (compression stockings, prescription to be mailed to the patient for her to take to a medical supply store)        Handouts provided.  Advised her to elevate her feet on some type of platform to relieve pressure off of the posterior thighs that may be coming from the edge of her seat(s).  Follow-up PRN    (R10.11) RUQ abdominal pain  (Z90.49) Status post laparoscopic cholecystectomy  Comment: I suspect an incisional hernia, and I think she would need a physical exam to look into this problem further  Plan: GENERAL SURG ADULT REFERRAL        Handouts provided    Cb Augustine MD      Video-Visit Details    Type of service:  Video Visit    Video Start Time: 1:19     Video End Time:1:40    Originating Location (pt. Location): Home    Distant Location (provider location):  [facility]     Platform used for Video Visit: [employer]

## 2020-07-28 NOTE — PROGRESS NOTES
Written DME is signed by Dr. Jacob and is mailed to patient's home address.  Sudheer Soliz,  For 1st Floor Primary Care

## 2020-10-05 ENCOUNTER — VIRTUAL VISIT (OUTPATIENT)
Dept: SURGERY | Facility: CLINIC | Age: 45
End: 2020-10-05
Attending: FAMILY MEDICINE

## 2020-10-05 DIAGNOSIS — R10.11 RUQ ABDOMINAL PAIN: ICD-10-CM

## 2020-10-05 DIAGNOSIS — Z90.49 STATUS POST LAPAROSCOPIC CHOLECYSTECTOMY: ICD-10-CM

## 2020-10-05 PROCEDURE — 99213 OFFICE O/P EST LOW 20 MIN: CPT | Mod: 95 | Performed by: SURGERY

## 2020-10-05 ASSESSMENT — PAIN SCALES - GENERAL: PAINLEVEL: NO PAIN (0)

## 2020-10-05 NOTE — PROGRESS NOTES
"Minerva Andrade is a 45 year old female who is being evaluated via a billable video visit.      The patient has been notified of following:     \"This video visit will be conducted via a call between you and your physician/provider. We have found that certain health care needs can be provided without the need for an in-person physical exam.  This service lets us provide the care you need with a video conversation.  If a prescription is necessary we can send it directly to your pharmacy.  If lab work is needed we can place an order for that and you can then stop by our lab to have the test done at a later time.    Video visits are billed at different rates depending on your insurance coverage.  Please reach out to your insurance provider with any questions.    If during the course of the call the physician/provider feels a video visit is not appropriate, you will not be charged for this service.\"    Patient has given verbal consent for Video visit? Yes  How would you like to obtain your AVS? MyChart  If you are dropped from the video visit, the video invite should be resent to: Mychart   Will anyone else be joining your video visit? No     [email or web address], MEDICAL ASSISTANT         Video-Visit Details    Type of service:  Video Visit    Video Start Time: 2:30 PM  Video End Time: 2:50 PM    Originating Location (pt. Location): Home    Distant Location (provider location):  [facility]     Platform used for Video Visit: Alirio    Chief Complaint: bulge in RUQ/under right breast    History of Present Illness:   45 year old woman who is status post laparoscopic cholecystectomy in 2018. She complains of a bulge under right breast, first noted about 3 months ago. She noted some some pain with eating, but is now asymptomatic. Had felt the same type of pain prior to her surgery, and had been asymptomatic since surgery until 3 months. She denies any other associated symptoms such as nausea, vomiting, jaundice " or fevers. She also denies any specific aggravating or alleviating factors.      No past medical history on file.  Past Surgical History:   Procedure Laterality Date     DILATION AND CURETTAGE SUCTION  2003/08/10    x 3     LAPAROSCOPIC CHOLECYSTECTOMY N/A 5/7/2018    Procedure: LAPAROSCOPIC CHOLECYSTECTOMY;  Laparoscopic cholecystectomy;  Surgeon: Alicia Medina MD;  Location: [facility] OR     Current Outpatient Medications   Medication     furosemide (LASIX) 20 MG tablet     multivitamin w/minerals (MULTI-VITAMIN) tablet     order for DME     albuterol (PROAIR HFA/PROVENTIL HFA/VENTOLIN HFA) 108 (90 Base) MCG/ACT Inhaler     phentermine (ADIPEX-P) 37.5 MG tablet     topiramate (TOPAMAX) 25 MG tablet     No current facility-administered medications for this visit.       No Known Allergies  Social History     Socioeconomic History     Marital status:      Spouse name: Michael Andrade     Number of children: 0     Years of education: 16     Highest education level: Not on file   Occupational History     Occupation:    Social Needs     Financial resource strain: Not on file     Food insecurity     Worry: Not on file     Inability: Not on file     Transportation needs     Medical: Not on file     Non-medical: Not on file   Tobacco Use     Smoking status: Never Smoker     Smokeless tobacco: Never Used   Substance and Sexual Activity     Alcohol use: Yes     Comment: 1-11 a year     Drug use: No     Sexual activity: Yes     Partners: Male     Birth control/protection: Pull-out method     Comment: None   Lifestyle     Physical activity     Days per week: Not on file     Minutes per session: Not on file     Stress: Not on file   Relationships     Social connections     Talks on phone: Not on file     Gets together: Not on file     Attends Worship service: Not on file     Active member of club or organization: Not on file     Attends meetings of clubs or organizations: Not on file     Relationship  status: Not on file     Intimate partner violence     Fear of current or ex partner: Not on file     Emotionally abused: Not on file     Physically abused: Not on file     Forced sexual activity: Not on file   Other Topics Concern     Parent/sibling w/ CABG, MI or angioplasty before 65F 55M? Not Asked   Social History Narrative     Not on file     Family History   Problem Relation Age of Onset     Diabetes Mother      Hypertension Mother      Arthritis Maternal Grandmother      Arthritis Maternal Aunt      Heart Disease Other            Review of Systems:  No chest pain, dyspnea, weight loss, fevers or night sweats. Some weight gain since pandemic  All other systems questioned and negative.     Exam:  Vitals - patient reports weight to be 272 lbs  Gen - sitting comfortably, NAD, able to ambulate without difficulty  Heent- grossly normocephalic, no jaundice or icterus noted, extraocular  movements intact  Resp- breathing comfortably, verbalized clearly w/o sob or cough  CV - extremities with no visible edema, fingertips well perfused  Abd- patient able to palpate abd in all 4 quadrants, appears soft, non  distended, no signs of tenderness- notes an indistinct area just below right breast which she states has mild tenderness on palpation- no skin changes visible  Skin - no visible rashes  Neuro grossly intact  Psych appropriate without obvious decreased mood or psychosis          IMPRESSION AND PLAN:  RUQ pain/mass. No clear abnormality noted on video visit- patient had been contacted earlier to change to an in person visit,and stated that she was unable to do that.   Hernias is this area are very uncommon. She could have a soft tissue lesion. I recommend in person exam, and she agreed to schedule this in November.       Alicia Medina MD

## 2020-10-05 NOTE — LETTER
"    10/5/2020         RE: Minerva Andrade  [address]  [facility] [place] [address]        Dear Colleague,    Thank you for referring your patient, Minerva Andrade, to the [facility]. Please see a copy of my visit note below.    Minerva Andrade is a 45 year old female who is being evaluated via a billable video visit.      The patient has been notified of following:     \"This video visit will be conducted via a call between you and your physician/provider. We have found that certain health care needs can be provided without the need for an in-person physical exam.  This service lets us provide the care you need with a video conversation.  If a prescription is necessary we can send it directly to your pharmacy.  If lab work is needed we can place an order for that and you can then stop by our lab to have the test done at a later time.    Video visits are billed at different rates depending on your insurance coverage.  Please reach out to your insurance provider with any questions.    If during the course of the call the physician/provider feels a video visit is not appropriate, you will not be charged for this service.\"    Patient has given verbal consent for Video visit? Yes  How would you like to obtain your AVS? MyChart  If you are dropped from the video visit, the video invite should be resent to: Mychart   Will anyone else be joining your video visit? No     [email or web address], MEDICAL ASSISTANT         Video-Visit Details    Type of service:  Video Visit    Video Start Time: 2:30 PM  Video End Time: 2:50 PM    Originating Location (pt. Location): Home    Distant Location (provider location):  [facility]     Platform used for Video Visit: Alirio    Chief Complaint: bulge in RUQ/under right breast    History of Present Illness:   45 year old woman who is status post laparoscopic cholecystectomy in 2018. She complains of a bulge under right breast, first noted " about 3 months ago. She noted some some pain with eating, but is now asymptomatic. Had felt the same type of pain prior to her surgery, and had been asymptomatic since surgery until 3 months. She denies any other associated symptoms such as nausea, vomiting, jaundice or fevers. She also denies any specific aggravating or alleviating factors.      No past medical history on file.  Past Surgical History:   Procedure Laterality Date     DILATION AND CURETTAGE SUCTION  2003/08/10    x 3     LAPAROSCOPIC CHOLECYSTECTOMY N/A 5/7/2018    Procedure: LAPAROSCOPIC CHOLECYSTECTOMY;  Laparoscopic cholecystectomy;  Surgeon: Alicia Medina MD;  Location: [facility] OR     Current Outpatient Medications   Medication     furosemide (LASIX) 20 MG tablet     multivitamin w/minerals (MULTI-VITAMIN) tablet     order for DME     albuterol (PROAIR HFA/PROVENTIL HFA/VENTOLIN HFA) 108 (90 Base) MCG/ACT Inhaler     phentermine (ADIPEX-P) 37.5 MG tablet     topiramate (TOPAMAX) 25 MG tablet     No current facility-administered medications for this visit.       No Known Allergies  Social History     Socioeconomic History     Marital status:      Spouse name: Michael Andrade     Number of children: 0     Years of education: 16     Highest education level: Not on file   Occupational History     Occupation:    Social Needs     Financial resource strain: Not on file     Food insecurity     Worry: Not on file     Inability: Not on file     Transportation needs     Medical: Not on file     Non-medical: Not on file   Tobacco Use     Smoking status: Never Smoker     Smokeless tobacco: Never Used   Substance and Sexual Activity     Alcohol use: Yes     Comment: 1-11 a year     Drug use: No     Sexual activity: Yes     Partners: Male     Birth control/protection: Pull-out method     Comment: None   Lifestyle     Physical activity     Days per week: Not on file     Minutes per session: Not on file     Stress: Not on file    Relationships     Social connections     Talks on phone: Not on file     Gets together: Not on file     Attends Sikhism service: Not on file     Active member of club or organization: Not on file     Attends meetings of clubs or organizations: Not on file     Relationship status: Not on file     Intimate partner violence     Fear of current or ex partner: Not on file     Emotionally abused: Not on file     Physically abused: Not on file     Forced sexual activity: Not on file   Other Topics Concern     Parent/sibling w/ CABG, MI or angioplasty before 65F 55M? Not Asked   Social History Narrative     Not on file     Family History   Problem Relation Age of Onset     Diabetes Mother      Hypertension Mother      Arthritis Maternal Grandmother      Arthritis Maternal Aunt      Heart Disease Other            Review of Systems:  No chest pain, dyspnea, weight loss, fevers or night sweats. Some weight gain since pandemic  All other systems questioned and negative.     Exam:  Vitals - patient reports weight to be 272 lbs  Gen - sitting comfortably, NAD, able to ambulate without difficulty  Heent- grossly normocephalic, no jaundice or icterus noted, extraocular  movements intact  Resp- breathing comfortably, verbalized clearly w/o sob or cough  CV - extremities with no visible edema, fingertips well perfused  Abd- patient able to palpate abd in all 4 quadrants, appears soft, non  distended, no signs of tenderness- notes an indistinct area just below right breast which she states has mild tenderness on palpation- no skin changes visible  Skin - no visible rashes  Neuro grossly intact  Psych appropriate without obvious decreased mood or psychosis          IMPRESSION AND PLAN:  RUQ pain/mass. No clear abnormality noted on video visit- patient had been contacted earlier to change to an in person visit,and stated that she was unable to do that.   Hernias is this area are very uncommon. She could have a soft tissue lesion. I  recommend in person exam, and she agreed to schedule this in November.       Alicia Medina MD            Again, thank you for allowing me to participate in the care of your patient.        Sincerely,        Alicia Medina MD

## 2020-12-20 ENCOUNTER — HEALTH MAINTENANCE LETTER (OUTPATIENT)
Age: 45
End: 2020-12-20

## 2021-02-15 ENCOUNTER — OFFICE VISIT (OUTPATIENT)
Dept: SURGERY | Facility: CLINIC | Age: 46
End: 2021-02-15
Payer: COMMERCIAL

## 2021-02-15 VITALS
DIASTOLIC BLOOD PRESSURE: 88 MMHG | HEART RATE: 111 BPM | BODY MASS INDEX: 43.4 KG/M2 | HEIGHT: 69 IN | SYSTOLIC BLOOD PRESSURE: 136 MMHG | WEIGHT: 293 LBS | OXYGEN SATURATION: 94 %

## 2021-02-15 DIAGNOSIS — R10.11 RUQ ABDOMINAL PAIN: Primary | ICD-10-CM

## 2021-02-15 PROCEDURE — 99214 OFFICE O/P EST MOD 30 MIN: CPT | Performed by: SURGERY

## 2021-02-15 ASSESSMENT — MIFFLIN-ST. JEOR: SCORE: 2061.81

## 2021-02-15 ASSESSMENT — PAIN SCALES - GENERAL: PAINLEVEL: EXTREME PAIN (8)

## 2021-02-15 NOTE — NURSING NOTE
Minerva Andrade's goals for this visit include:   Chief Complaint   Patient presents with     Consult For     RUQ abdominal pain        She requests these members of her care team be copied on today's visit information: Yes     PCP: Angelica Santos    Referring Provider:  No referring provider defined for this encounter.    There were no vitals taken for this visit.    Do you need any medication refills at today's visit? No   [id number], MEDICAL ASSISTANT

## 2021-02-15 NOTE — LETTER
2/15/2021         RE: Minerva Andrade  [address]  [facility] [address]        Dear Colleague,    Thank you for referring your patient, Minerva Andrade, to the [facility]. Please see a copy of my visit note below.    Chief Complaint: RUQ pain    History of Present Illness:   45 year old woman who presents for a follow up of a virtual visit. She was complaining on RUQ pain. She states that since the original consult in October, her pain had resolved, until it recurred a few weeks ago. She describes at somewhere between dull and gnawing, and actual pain. The pain is often instigates and aggravated with movements such as bending, and alleviated by lying on the other side. She states that her bra strap also aggravates the pain. She denies noting any type of protrusion in the area. She has tried taking NSAIDs to relieve the pain and does not note any improvement.  She denies any GI symptoms associate with the pain.       No past medical history on file.  Past Surgical History:   Procedure Laterality Date     DILATION AND CURETTAGE SUCTION  2003/08/10    x 3     LAPAROSCOPIC CHOLECYSTECTOMY N/A 5/7/2018    Procedure: LAPAROSCOPIC CHOLECYSTECTOMY;  Laparoscopic cholecystectomy;  Surgeon: Alicia Medina MD;  Location: [facility] OR     Current Outpatient Medications   Medication     furosemide (LASIX) 20 MG tablet     order for DME     albuterol (PROAIR HFA/PROVENTIL HFA/VENTOLIN HFA) 108 (90 Base) MCG/ACT Inhaler     multivitamin w/minerals (MULTI-VITAMIN) tablet     phentermine (ADIPEX-P) 37.5 MG tablet     topiramate (TOPAMAX) 25 MG tablet     No current facility-administered medications for this visit.       No Known Allergies  Social History     Socioeconomic History     Marital status:      Spouse name: Michael Andrade     Number of children: 0     Years of education: 16     Highest education level: Not on file   Occupational History     Occupation: Construction  "   Social Needs     Financial resource strain: Not on file     Food insecurity     Worry: Not on file     Inability: Not on file     Transportation needs     Medical: Not on file     Non-medical: Not on file   Tobacco Use     Smoking status: Never Smoker     Smokeless tobacco: Never Used   Substance and Sexual Activity     Alcohol use: Yes     Comment: 1-11 a year     Drug use: No     Sexual activity: Yes     Partners: Male     Birth control/protection: Pull-out method     Comment: None   Lifestyle     Physical activity     Days per week: Not on file     Minutes per session: Not on file     Stress: Not on file   Relationships     Social connections     Talks on phone: Not on file     Gets together: Not on file     Attends Zoroastrianism service: Not on file     Active member of club or organization: Not on file     Attends meetings of clubs or organizations: Not on file     Relationship status: Not on file     Intimate partner violence     Fear of current or ex partner: Not on file     Emotionally abused: Not on file     Physically abused: Not on file     Forced sexual activity: Not on file   Other Topics Concern     Parent/sibling w/ CABG, MI or angioplasty before 65F 55M? Not Asked   Social History Narrative     Not on file     Family History   Problem Relation Age of Onset     Diabetes Mother      Hypertension Mother      Arthritis Maternal Grandmother      Arthritis Maternal Aunt      Heart Disease Other            Review of Systems:  No chest pain, dyspnea, weight loss, fevers or night sweats.   All other systems questioned and negative.     Exam:  Vital signs for exam: /88 (BP Location: Left arm, Patient Position: Sitting, Cuff Size: Adult Large)   Pulse 111   Ht 1.745 m (5' 8.7\")   Wt 134.7 kg (297 lb)   SpO2 94%   BMI 44.24 kg/m    Constitutional: healthy, alert and no distress.  Head: Normocephalic. No masses, lesions, tenderness or abnormalities.  Gastrointestinal:  Abdomen soft, non-tender. " No masses, organomegaly. Well healed laparoscopic incisions. Tenderness along the costal margin, also present along left costal margin, no palpable hernias or masses  Musculoskeletal: extremities normal- no gross deformities noted and normal muscle tone  Skin: no jaundice, rashes or petechiae.   Neurologic: Gait normal.  Psychiatric: Mentation appears normal and affect normal.    Radiology:   None    IMPRESSION AND PLAN:  Tenderness along the costal margin bilaterally, may be costocondritis. No evidence of a hernia or soft tissue mass on exam. Recommended trying heat and NSAIDs.     30 minutes spent on the day of service  reviewing the chart, evaluating the patient, and compiling the note.        Again, thank you for allowing me to participate in the care of your patient.        Sincerely,        Alicia Medina MD

## 2021-02-15 NOTE — PROGRESS NOTES
Chief Complaint: RUQ pain    History of Present Illness:   45 year old woman who presents for a follow up of a virtual visit. She was complaining on RUQ pain. She states that since the original consult in October, her pain had resolved, until it recurred a few weeks ago. She describes at somewhere between dull and gnawing, and actual pain. The pain is often instigates and aggravated with movements such as bending, and alleviated by lying on the other side. She states that her bra strap also aggravates the pain. She denies noting any type of protrusion in the area. She has tried taking NSAIDs to relieve the pain and does not note any improvement.  She denies any GI symptoms associate with the pain.       No past medical history on file.  Past Surgical History:   Procedure Laterality Date     DILATION AND CURETTAGE SUCTION  2003/08/10    x 3     LAPAROSCOPIC CHOLECYSTECTOMY N/A 5/7/2018    Procedure: LAPAROSCOPIC CHOLECYSTECTOMY;  Laparoscopic cholecystectomy;  Surgeon: Alicia Medina MD;  Location: [facility] OR     Current Outpatient Medications   Medication     furosemide (LASIX) 20 MG tablet     order for DME     albuterol (PROAIR HFA/PROVENTIL HFA/VENTOLIN HFA) 108 (90 Base) MCG/ACT Inhaler     multivitamin w/minerals (MULTI-VITAMIN) tablet     phentermine (ADIPEX-P) 37.5 MG tablet     topiramate (TOPAMAX) 25 MG tablet     No current facility-administered medications for this visit.       No Known Allergies  Social History     Socioeconomic History     Marital status:      Spouse name: Michael Andrade     Number of children: 0     Years of education: 16     Highest education level: Not on file   Occupational History     Occupation:    Social Needs     Financial resource strain: Not on file     Food insecurity     Worry: Not on file     Inability: Not on file     Transportation needs     Medical: Not on file     Non-medical: Not on file   Tobacco Use     Smoking status: Never Smoker      "Smokeless tobacco: Never Used   Substance and Sexual Activity     Alcohol use: Yes     Comment: 1-11 a year     Drug use: No     Sexual activity: Yes     Partners: Male     Birth control/protection: Pull-out method     Comment: None   Lifestyle     Physical activity     Days per week: Not on file     Minutes per session: Not on file     Stress: Not on file   Relationships     Social connections     Talks on phone: Not on file     Gets together: Not on file     Attends Baptism service: Not on file     Active member of club or organization: Not on file     Attends meetings of clubs or organizations: Not on file     Relationship status: Not on file     Intimate partner violence     Fear of current or ex partner: Not on file     Emotionally abused: Not on file     Physically abused: Not on file     Forced sexual activity: Not on file   Other Topics Concern     Parent/sibling w/ CABG, MI or angioplasty before 65F 55M? Not Asked   Social History Narrative     Not on file     Family History   Problem Relation Age of Onset     Diabetes Mother      Hypertension Mother      Arthritis Maternal Grandmother      Arthritis Maternal Aunt      Heart Disease Other            Review of Systems:  No chest pain, dyspnea, weight loss, fevers or night sweats.   All other systems questioned and negative.     Exam:  Vital signs for exam: /88 (BP Location: Left arm, Patient Position: Sitting, Cuff Size: Adult Large)   Pulse 111   Ht 1.745 m (5' 8.7\")   Wt 134.7 kg (297 lb)   SpO2 94%   BMI 44.24 kg/m    Constitutional: healthy, alert and no distress.  Head: Normocephalic. No masses, lesions, tenderness or abnormalities.  Gastrointestinal:  Abdomen soft, non-tender. No masses, organomegaly. Well healed laparoscopic incisions. Tenderness along the costal margin, also present along left costal margin, no palpable hernias or masses  Musculoskeletal: extremities normal- no gross deformities noted and normal muscle tone  Skin: no " jaundice, rashes or petechiae.   Neurologic: Gait normal.  Psychiatric: Mentation appears normal and affect normal.    Radiology:   None    IMPRESSION AND PLAN:  Tenderness along the costal margin bilaterally, may be costocondritis. No evidence of a hernia or soft tissue mass on exam. Recommended trying heat and NSAIDs.     30 minutes spent on the day of service  reviewing the chart, evaluating the patient, and compiling the note.

## 2021-04-24 ENCOUNTER — HEALTH MAINTENANCE LETTER (OUTPATIENT)
Age: 46
End: 2021-04-24

## 2021-05-18 ENCOUNTER — OFFICE VISIT (OUTPATIENT)
Dept: FAMILY MEDICINE | Facility: CLINIC | Age: 46
End: 2021-05-18
Payer: COMMERCIAL

## 2021-05-18 VITALS
OXYGEN SATURATION: 97 % | TEMPERATURE: 98.8 F | HEART RATE: 104 BPM | WEIGHT: 293 LBS | SYSTOLIC BLOOD PRESSURE: 138 MMHG | RESPIRATION RATE: 18 BRPM | DIASTOLIC BLOOD PRESSURE: 83 MMHG | BODY MASS INDEX: 44.79 KG/M2

## 2021-05-18 DIAGNOSIS — Z12.31 ENCOUNTER FOR SCREENING MAMMOGRAM FOR BREAST CANCER: ICD-10-CM

## 2021-05-18 DIAGNOSIS — N91.2 AMENORRHEA: Primary | ICD-10-CM

## 2021-05-18 LAB
ERYTHROCYTE [DISTWIDTH] IN BLOOD BY AUTOMATED COUNT: 16.4 % (ref 10–15)
HCT VFR BLD AUTO: 34.7 % (ref 35–47)
HGB BLD-MCNC: 12 G/DL (ref 11.7–15.7)
MCH RBC QN AUTO: 24.7 PG (ref 26.5–33)
MCHC RBC AUTO-ENTMCNC: 34.6 G/DL (ref 31.5–36.5)
MCV RBC AUTO: 72 FL (ref 78–100)
PLATELET # BLD AUTO: 244 10E9/L (ref 150–450)
RBC # BLD AUTO: 4.85 10E12/L (ref 3.8–5.2)
WBC # BLD AUTO: 4.6 10E9/L (ref 4–11)

## 2021-05-18 PROCEDURE — 82670 ASSAY OF TOTAL ESTRADIOL: CPT | Performed by: NURSE PRACTITIONER

## 2021-05-18 PROCEDURE — 36415 COLL VENOUS BLD VENIPUNCTURE: CPT | Performed by: NURSE PRACTITIONER

## 2021-05-18 PROCEDURE — 84144 ASSAY OF PROGESTERONE: CPT | Performed by: NURSE PRACTITIONER

## 2021-05-18 PROCEDURE — 99213 OFFICE O/P EST LOW 20 MIN: CPT | Performed by: NURSE PRACTITIONER

## 2021-05-18 PROCEDURE — 80048 BASIC METABOLIC PNL TOTAL CA: CPT | Performed by: NURSE PRACTITIONER

## 2021-05-18 PROCEDURE — 83001 ASSAY OF GONADOTROPIN (FSH): CPT | Performed by: NURSE PRACTITIONER

## 2021-05-18 PROCEDURE — 85027 COMPLETE CBC AUTOMATED: CPT | Performed by: NURSE PRACTITIONER

## 2021-05-18 PROCEDURE — 84443 ASSAY THYROID STIM HORMONE: CPT | Performed by: NURSE PRACTITIONER

## 2021-05-18 ASSESSMENT — PAIN SCALES - GENERAL: PAINLEVEL: NO PAIN (0)

## 2021-05-18 NOTE — PROGRESS NOTES
"    Assessment & Plan     Amenorrhea  No menses since July 2020. Had IUD removed in 2018, some spotting once in a while from then til July 2020 then nothing. Had fibroid when she was pregnant but unsure if that is still there. Check pelvic US and labs.   - TSH with free T4 reflex  - Estradiol  - Progesterone  - Follicle stimulating hormone  - Basic metabolic panel  (Ca, Cl, CO2, Creat, Gluc, K, Na, BUN)  - CBC with platelets  - US Pelvic Complete with Transvaginal; Future    Encounter for screening mammogram for breast cancer  Due for screening. Hx of biopsy of one of her breasts  - *MA Screening Digital Bilateral; Future       BMI:   Estimated body mass index is 44.79 kg/m  as calculated from the following:    Height as of 2/15/21: 1.745 m (5' 8.7\").    Weight as of this encounter: 136.4 kg (300 lb 11.2 oz).   Weight management plan: did not discuss, will next time      Return in about 1 week (around 5/25/2021), or if symptoms worsen or fail to improve.    KAREY Pitt, NP-C  [facility]    Alessio Palma is a 45 year old who presents for the following health issues     HPI     Had IUD removed in 2018 and has not had a menses since July of 2020.  May/June/July 2020 some spotting irregular, no cramping but nothing major. Then nothing since July 2020.     Prior to IUD and pregnancy she was regular. Was told she had a fibroid when she was pregnant and it was small.   Mother went through menopause in late 50s          Review of Systems   Constitutional, HEENT, cardiovascular, pulmonary, gi and gu systems are negative, except as otherwise noted.      Objective    /83 (BP Location: Right arm, Patient Position: Sitting, Cuff Size: Adult Large)   Pulse 104   Temp 98.8  F (37.1  C) (Oral)   Resp 18   Wt 136.4 kg (300 lb 11.2 oz)   SpO2 97%   BMI 44.79 kg/m    Body mass index is 44.79 kg/m .  Physical Exam   GENERAL: healthy, alert and no distress  RESP: lungs clear to auscultation - no " rales, rhonchi or wheezes  CV: regular rate and rhythm, normal S1 S2, no S3 or S4, no murmur, click or rub  ABDOMEN: soft, nontender, no hepatosplenomegaly, no masses and bowel sounds normal  MS: no gross musculoskeletal defects noted, no edema    Results for orders placed or performed in visit on 05/18/21 (from the past 24 hour(s))   CBC with platelets   Result Value Ref Range    WBC 4.6 4.0 - 11.0 10e9/L    RBC Count 4.85 3.8 - 5.2 10e12/L    Hemoglobin 12.0 11.7 - 15.7 g/dL    Hematocrit 34.7 (L) 35.0 - 47.0 %    MCV 72 (L) 78 - 100 fl    MCH 24.7 (L) 26.5 - 33.0 pg    MCHC 34.6 31.5 - 36.5 g/dL    RDW 16.4 (H) 10.0 - 15.0 %    Platelet Count 244 150 - 450 10e9/L

## 2021-05-19 LAB
ANION GAP SERPL CALCULATED.3IONS-SCNC: 7 MMOL/L (ref 3–14)
BUN SERPL-MCNC: 12 MG/DL (ref 7–30)
CALCIUM SERPL-MCNC: 8.8 MG/DL (ref 8.5–10.1)
CHLORIDE SERPL-SCNC: 106 MMOL/L (ref 94–109)
CO2 SERPL-SCNC: 25 MMOL/L (ref 20–32)
CREAT SERPL-MCNC: 0.83 MG/DL (ref 0.52–1.04)
ESTRADIOL SERPL-MCNC: 36 PG/ML
FSH SERPL-ACNC: 26.3 IU/L
GFR SERPL CREATININE-BSD FRML MDRD: 86 ML/MIN/{1.73_M2}
GLUCOSE SERPL-MCNC: 102 MG/DL (ref 70–99)
POTASSIUM SERPL-SCNC: 3.7 MMOL/L (ref 3.4–5.3)
PROGEST SERPL-MCNC: 0.3 NG/ML
SODIUM SERPL-SCNC: 138 MMOL/L (ref 133–144)
TSH SERPL DL<=0.005 MIU/L-ACNC: 1.2 MU/L (ref 0.4–4)

## 2021-05-19 NOTE — RESULT ENCOUNTER NOTE
Burke Palma,   Your hemoglobin and white blood count are normal.  The other labs are still pending.  Thank you,   KAREY Pitt, NP-C  [facility]

## 2021-05-20 NOTE — RESULT ENCOUNTER NOTE
Burke Palma,   So far the labs are not indicative of your lack of menses. Get the ultrasound done and I will be in touch.   Thank you,  KAREY Pitt, NP-C  [facility]

## 2021-07-02 ENCOUNTER — ANCILLARY PROCEDURE (OUTPATIENT)
Dept: MAMMOGRAPHY | Facility: CLINIC | Age: 46
End: 2021-07-02
Attending: NURSE PRACTITIONER
Payer: COMMERCIAL

## 2021-07-02 ENCOUNTER — ANCILLARY PROCEDURE (OUTPATIENT)
Dept: ULTRASOUND IMAGING | Facility: CLINIC | Age: 46
End: 2021-07-02
Attending: NURSE PRACTITIONER
Payer: COMMERCIAL

## 2021-07-02 DIAGNOSIS — Z12.31 ENCOUNTER FOR SCREENING MAMMOGRAM FOR BREAST CANCER: ICD-10-CM

## 2021-07-02 DIAGNOSIS — N91.2 AMENORRHEA: ICD-10-CM

## 2021-07-02 PROCEDURE — 77063 BREAST TOMOSYNTHESIS BI: CPT | Performed by: RADIOLOGY

## 2021-07-02 PROCEDURE — 77067 SCR MAMMO BI INCL CAD: CPT | Performed by: RADIOLOGY

## 2021-07-02 PROCEDURE — 76856 US EXAM PELVIC COMPLETE: CPT | Mod: 59 | Performed by: RADIOLOGY

## 2021-07-02 PROCEDURE — 76830 TRANSVAGINAL US NON-OB: CPT | Performed by: RADIOLOGY

## 2021-07-05 NOTE — RESULT ENCOUNTER NOTE
Burke Palma,   Your pelvic ultrasound was normal. I would recommend follow up with ELISHA if you have on-going lack of menses for further workup. Please let me know if you have questions or need a referral.  Thank you,  KAREY Pitt, NP-C  [facility]

## 2021-07-05 NOTE — RESULT ENCOUNTER NOTE
Burke Palma,   Your mammogram was normal. Repeat in 1 year.  Thank you,  KAREY Pitt, NP-C  [facility]

## 2021-09-23 ENCOUNTER — TELEPHONE (OUTPATIENT)
Dept: FAMILY MEDICINE | Facility: CLINIC | Age: 46
End: 2021-09-23

## 2021-09-23 ENCOUNTER — OFFICE VISIT (OUTPATIENT)
Dept: FAMILY MEDICINE | Facility: CLINIC | Age: 46
End: 2021-09-23
Payer: COMMERCIAL

## 2021-09-23 VITALS
HEART RATE: 75 BPM | DIASTOLIC BLOOD PRESSURE: 86 MMHG | WEIGHT: 293 LBS | TEMPERATURE: 98.1 F | BODY MASS INDEX: 44.9 KG/M2 | SYSTOLIC BLOOD PRESSURE: 138 MMHG | OXYGEN SATURATION: 97 %

## 2021-09-23 DIAGNOSIS — N63.0 LUMP OR MASS IN BREAST: Primary | ICD-10-CM

## 2021-09-23 PROCEDURE — 99213 OFFICE O/P EST LOW 20 MIN: CPT | Performed by: FAMILY MEDICINE

## 2021-09-23 NOTE — TELEPHONE ENCOUNTER
Pt states she noticed a lump in her breast last evening.  She is not having any pain or discharge from breast.  She does not have a history of breast cancer. Appointment made for this morning.  Lilo DEGROOTN, RN

## 2021-09-23 NOTE — PATIENT INSTRUCTIONS
Please call [facility] in [place] at [phone] to schedule breast imaging    Warm compress to breast 3-4 x's a day  Monitor for expanding redness/pain/fever/chills/body aches.

## 2021-09-23 NOTE — PROGRESS NOTES
Assessment & Plan     Lump or mass in breast  Small tender and slightly red subcutaneous mass medial portion of her left breast.  Actually suspect this could be a small cyst that is inflamed but certainly diagnostic imaging is warranted.  She currently has no signs and symptoms of mastitis but also need to monitor for that.  Referral for diagnostic imaging is made and phone numbers given to patient.  Encouraged warm compresses to the breast for possible cyst and close monitoring for more infectious etiology recommended      Patient Instructions   Please call [facility] in [place] at [phone] to schedule breast imaging    Warm compress to breast 3-4 x's a day  Monitor for expanding redness/pain/fever/chills/body aches.         Return in about 1 week (around 9/30/2021), or if symptoms worsen or fail to improve and pending results of imaging.    Donna Olmos MD  [facility] ANGELA Palma is a 46 year old who presents for the following health issues   HPI     Concern - breast lump  Onset: noticed last night  Description: lump in left breast  Intensity: no pain  Progression of Symptoms:  same  Accompanying Signs & Symptoms: none  Previous history of similar problem: none  Precipitating factors:        Worsened by: none  Alleviating factors:        Improved by: none  Therapies tried and outcome:  none     2018 had biopsy done in left upper breast with benign results.   Last mammo in July was normal  No fam hx of breast cancer.   No adenopathy  Wt stable to up.   No fever/chills    Amenorrhea followed by her pcp. Possibly menopausal        Objective    /86   Pulse 75   Temp 98.1  F (36.7  C) (Oral)   Wt 136.7 kg (301 lb 6.4 oz)   SpO2 97%   BMI 44.90 kg/m    Body mass index is 44.9 kg/m .  Physical Exam   GENERAL: healthy, alert and no distress  NECK: no adenopathy, no asymmetry, masses, or scars and thyroid normal to palpation  RESP: lungs clear to  auscultation - no rales, rhonchi or wheezes  BREAST: large pendulous breasts. right breast normal without masses, tenderness or nipple discharge and no bilateral palpable axillary masses or adenopathy. Left breast with mild erythema/hyperpigmentation present medial breast  (approx size of quarter) with tender approx 7 mm subcutaneous lesion in center. Not really appreciating deeper breast mass  CV: regular rate and rhythm, normal S1 S2, no S3 or S4, no murmur, click or rub, no peripheral edema and peripheral pulses strong  PSYCH: mentation appears normal, affect normal/bright

## 2021-09-24 ENCOUNTER — ANCILLARY PROCEDURE (OUTPATIENT)
Dept: ULTRASOUND IMAGING | Facility: CLINIC | Age: 46
End: 2021-09-24
Attending: FAMILY MEDICINE
Payer: COMMERCIAL

## 2021-09-24 DIAGNOSIS — N63.0 LUMP OR MASS IN BREAST: ICD-10-CM

## 2021-09-24 PROCEDURE — 76642 ULTRASOUND BREAST LIMITED: CPT | Mod: LT | Performed by: RADIOLOGY

## 2021-09-24 NOTE — RESULT ENCOUNTER NOTE
Burke Palma,  Good news on your imaging. The radiologist also thought this lump was a small cyst just under the skin of your breast and it did NOT look like breast cancer. Certainly, keep an eye on this spot. If it persists or enlarges, we can certainly refer you to a dermatologist for removal.   Kind regards,  Donna Olmos MD

## 2021-09-24 NOTE — LETTER
Minerva Andrade  [address] [address]              September 24, 2021  Date of Exam: 09/24/2021    Dear Minerva:    Thank you for your recent visit.  Breast Imaging Result: We are pleased to inform you that the results of your recent breast imaging show no evidence of malignancy (cancer).    If you are experiencing any breast problems such as a lump or localized pain we request that you discuss this with your health care team if you haven t already done so, as additional testing may be necessary.    As you know, early detection of cancer is very important. Although not all cancers are found through breast imaging, it is the most accurate method for early detection. A thorough examination includes a combination of breast imaging, physical examination and breast self-examination. Currently the American College of Radiology and the Society of Breast Imaging recommend breast imaging beginning at the age of 40.    A report of your breast imaging results was sent to: Donna Olmos    Your breast imaging will become part of your medical file here at [facility] for at least 10 years. You are responsible for informing any new health care team or breast imaging facility of the date and location of this examination.    We appreciate the opportunity to participate in your health care.    Sincerely,  Dr. Potts and Dr. London  [facility]

## 2021-09-24 NOTE — LETTER
October 1, 2021      Minerva Andrade  [address] [address] [address]        Dear ,    Burke Palma,  Good news on your imaging. The radiologist also thought this lump was a small cyst just under the skin of your breast and it did NOT look like breast cancer. Certainly, keep an eye on this spot. If it persists or enlarges, we can certainly refer you to a dermatologist for removal.     Resulted Orders   US Breast Left    Narrative    Examinations: US BREAST LEFT LIMITED 1-3 QUADRANTS, 9/24/2021 8:47 AM    Comparisons: Mammogram 7/2/2021    History/family history: Left breast lump first noticed one week ago.     Findings:     Targeted ultrasound evaluation was performed by the technologist and  radiologist. In the left breast at the 7:00 position, 12 cm from the  nipple, there is a hypoechoic skin lesion measuring 1.4 x 0.9 x 0.4  cm. The underlying breast tissue is normal.      Impression    Impression: BI-RADS CATEGORY: 2 - Benign Finding(s)    Recommended Follow-up: Annual Mammography.    The finding and recommendation were discussed with the patient at the  time of evaluation.    The patient was given the results of the examination.    I have personally reviewed the examination and initial interpretation  and I agree with the findings.    JEMAL KIM MD         SYSTEM ID:  [id number]       If you have any questions or concerns, please call the clinic at the number listed above.       Sincerely,      Donna Olmos MD

## 2021-10-03 ENCOUNTER — HEALTH MAINTENANCE LETTER (OUTPATIENT)
Age: 46
End: 2021-10-03

## 2022-05-15 ENCOUNTER — HEALTH MAINTENANCE LETTER (OUTPATIENT)
Age: 47
End: 2022-05-15

## 2022-05-15 ENCOUNTER — NURSE TRIAGE (OUTPATIENT)
Dept: NURSING | Facility: CLINIC | Age: 47
End: 2022-05-15
Payer: COMMERCIAL

## 2022-05-16 NOTE — TELEPHONE ENCOUNTER
Patient was positive for covid on Tuesday or Wednesday.  Her symptoms started on Sunday last week.  Patient states she is having some shortness of breath, confusion, chest pressure.  Patient seems confused when talking with her.  She was slow to respond.  Patient denies any fever.  Patient is stating she is extremely fatigued.  She said she can get to the bathroom but it would take her a while.    Plan was made for her to go to ED now.  Her  will drive her.  She will go to [facility] in [place].    Brandi Lopez RN   05/15/22 8:21 PM  [facility] Nurse Advisor    Reason for Disposition    MODERATE difficulty breathing (e.g., speaks in phrases, SOB even at rest, pulse 100-120)    Additional Information    Negative: SEVERE difficulty breathing (e.g., struggling for each breath, speaks in single words)    Negative: Difficult to awaken or acting confused (e.g., disoriented, slurred speech)    Negative: Bluish (or gray) lips or face now    Negative: Shock suspected (e.g., cold/pale/clammy skin, too weak to stand, low BP, rapid pulse)    Negative: Sounds like a life-threatening emergency to the triager    Negative: [1] Diagnosed or suspected COVID-19 AND [2] symptoms lasting 3 or more weeks    Negative: [1] COVID-19 exposure AND [2] no symptoms    Negative: COVID-19 vaccine reaction suspected (e.g., fever, headache, muscle aches) occurring 1 to 3 days after getting vaccine    Negative: COVID-19 vaccine, questions about    Negative: [1] Lives with someone known to have influenza (flu test positive) AND [2] flu-like symptoms (e.g., cough, runny nose, sore throat, SOB; with or without fever)    Negative: [1] Adult with possible COVID-19 symptoms AND [2] triager concerned about severity of symptoms or other causes    Negative: COVID-19 and breastfeeding, questions about    Negative: SEVERE or constant chest pain or pressure  (Exception: Mild central chest pain, present only when  coughing.)    Protocols used: CORONAVIRUS (COVID-19) DIAGNOSED OR [id number] 1.18.2022

## 2022-05-19 ENCOUNTER — TELEPHONE (OUTPATIENT)
Dept: FAMILY MEDICINE | Facility: CLINIC | Age: 47
End: 2022-05-19
Payer: COMMERCIAL

## 2022-05-19 NOTE — TELEPHONE ENCOUNTER
Patient tested positive for COVID 5/10/22. Patient states that she is having body aches, weakness, fevers off and on, and dry cough. No SOB, wheezing or phlegm noted. Advised that if she is still not feeling any better on Monday to call for an appointment.    Yara Joshi RN [facility]

## 2022-09-10 ENCOUNTER — HEALTH MAINTENANCE LETTER (OUTPATIENT)
Age: 47
End: 2022-09-10

## 2023-03-20 ENCOUNTER — VIRTUAL VISIT (OUTPATIENT)
Dept: FAMILY MEDICINE | Facility: CLINIC | Age: 48
End: 2023-03-20
Payer: COMMERCIAL

## 2023-03-20 DIAGNOSIS — Z53.9 ERRONEOUS ENCOUNTER--DISREGARD: Primary | ICD-10-CM

## 2023-03-20 NOTE — PROGRESS NOTES
Complaining of some discomfort/lump on the right breast area  Hurts whenever she bends  Going on since last couple of years  Problem initially started after 2018 when she had a lap cholecystectomy  She was evaluated by the surgeon Dr. Medina and felt there was no abnormality there  I did review her records  It was felt that she could be having costochondritis at that time however problem is still persisting  Explained to her that I need to do physical examination to decide about further investigations are needed  Since we cannot accomplish this today I will see her on 26 April and at that point take care of this  This visit will not be charged  Answers for HPI/ROS submitted by the patient on 3/20/2023  What is the reason for your visit today? : chest pain  How many servings of fruits and vegetables do you eat daily?: 2-3  On average, how many sweetened beverages do you drink each day (Examples: soda, juice, sweet tea, etc.  Do NOT count diet or artificially sweetened beverages)?: 0  How many minutes a day do you exercise enough to make your heart beat faster?: 20 to 29  How many days a week do you exercise enough to make your heart beat faster?: 7  How many days per week do you miss taking your medication?: 0

## 2023-03-20 NOTE — PROGRESS NOTES
Minerva is a 47 year old who is being evaluated via a billable video visit.      How would you like to obtain your AVS? MyChart  If the video visit is dropped, the invitation should be resent by: Send to e-mail at: [email or web address]  Will anyone else be joining your video visit? No  {If patient encounters technical issues they should call [phone] :624693}        {PROVIDER CHARTING PREFERENCE:578232}    Subjective   Minerva is a 47 year old{ACCOMPANIED BY STATEMENT (Optional):456147}, presenting for the following health issues:  Chest Pain      History of Present Illness       Reason for visit:  Chest pain    She eats 2-3 servings of fruits and vegetables daily.She consumes 0 sweetened beverage(s) daily.She exercises with enough effort to increase her heart rate 20 to 29 minutes per day.  She exercises with enough effort to increase her heart rate 7 days per week.   She is taking medications regularly.       Pain History:  When did you first notice your pain? - Acute Pain   Have you seen anyone else for your pain? Yes - provider in 2020  Where in your body do you have pain? Chest Pain  Onset/Duration: few years   Description:   Location: middle right side.under right breast.  Character: sharp  Radiation: on right side of back  Duration: about two minutes and intermittent   Intensity: severe  Progression of Symptoms: worsening and intermittent  Accompanying Signs & Symptoms:  Shortness of breath: YES  Sweating: No  Nausea/vomiting: uncommonly nauseous sometimes.  Lightheadedness: No  Palpitations: YES  Fever/Chills: No  Cough: YES           Heartburn: YES  History:   Family history of heart disease: No  Tobacco use: No  Previous similar symptoms: YES  Precipitating factors:   Worse with exertion: No  Worse with deep breaths: No           Related to eating: Yes, worse with eating            Better with burping: No  Alleviating factors: none  Therapies tried and outcome: ibuprofen, tylenol, and rest are  "helpful.    {Links to Pain Management SmartSet and MNPMP (Optional) :242359}  {additonal problems for provider to add (Optional):070013}    Review of Systems   {ROS COMP (Optional):246805}      Objective           Vitals:  No vitals were obtained today due to virtual visit.    Physical Exam   {video visit exam brief selected:145264::\"GENERAL: Healthy, alert and no distress\",\"EYES: Eyes grossly normal to inspection.  No discharge or erythema, or obvious scleral/conjunctival abnormalities.\",\"RESP: No audible wheeze, cough, or visible cyanosis.  No visible retractions or increased work of breathing.  \",\"SKIN: Visible skin clear. No significant rash, abnormal pigmentation or lesions.\",\"NEURO: Cranial nerves grossly intact.  Mentation and speech appropriate for age.\",\"PSYCH: Mentation appears normal, affect normal/bright, judgement and insight intact, normal speech and appearance well-groomed.\"}    {Diagnostic Test Results (Optional):349383}    {AMBULATORY ATTESTATION (Optional):289527}        Video-Visit Details    Type of service:  Video Visit     Originating Location (pt. Location): {video visit patient location:423178::\"Home\"}  {PROVIDER LOCATION On-site should be selected for visits conducted from your clinic location or adjoining [facility] hospital, academic office, or other nearby [facility] building. Off-site should be selected for all other provider locations, including home:953226}  Distant Location (provider location):  {virtual location provider:360303}  Platform used for Video Visit: {Virtual Visit Platforms:116687::\"Puzzlium\"}    "

## 2023-05-10 ENCOUNTER — OFFICE VISIT (OUTPATIENT)
Dept: FAMILY MEDICINE | Facility: CLINIC | Age: 48
End: 2023-05-10
Payer: COMMERCIAL

## 2023-05-10 VITALS
WEIGHT: 293 LBS | SYSTOLIC BLOOD PRESSURE: 143 MMHG | TEMPERATURE: 99 F | HEIGHT: 69 IN | OXYGEN SATURATION: 96 % | HEART RATE: 87 BPM | RESPIRATION RATE: 20 BRPM | BODY MASS INDEX: 43.4 KG/M2 | DIASTOLIC BLOOD PRESSURE: 97 MMHG

## 2023-05-10 DIAGNOSIS — Z12.31 VISIT FOR SCREENING MAMMOGRAM: ICD-10-CM

## 2023-05-10 DIAGNOSIS — Z13.220 SCREENING FOR HYPERLIPIDEMIA: ICD-10-CM

## 2023-05-10 DIAGNOSIS — M54.50 CHRONIC RIGHT-SIDED LOW BACK PAIN WITHOUT SCIATICA: ICD-10-CM

## 2023-05-10 DIAGNOSIS — G89.29 CHRONIC RIGHT-SIDED LOW BACK PAIN WITHOUT SCIATICA: ICD-10-CM

## 2023-05-10 DIAGNOSIS — Z13.1 SCREENING FOR DIABETES MELLITUS: ICD-10-CM

## 2023-05-10 DIAGNOSIS — Z12.11 SCREEN FOR COLON CANCER: ICD-10-CM

## 2023-05-10 DIAGNOSIS — Z00.00 ROUTINE GENERAL MEDICAL EXAMINATION AT A HEALTH CARE FACILITY: Primary | ICD-10-CM

## 2023-05-10 LAB
ANION GAP SERPL CALCULATED.3IONS-SCNC: 9 MMOL/L (ref 7–15)
BASOPHILS # BLD AUTO: 0 10E3/UL (ref 0–0.2)
BASOPHILS NFR BLD AUTO: 1 %
BUN SERPL-MCNC: 12.5 MG/DL (ref 6–20)
CALCIUM SERPL-MCNC: 9.2 MG/DL (ref 8.6–10)
CHLORIDE SERPL-SCNC: 108 MMOL/L (ref 98–107)
CHOLEST SERPL-MCNC: 178 MG/DL
CREAT SERPL-MCNC: 0.76 MG/DL (ref 0.51–0.95)
DEPRECATED HCO3 PLAS-SCNC: 25 MMOL/L (ref 22–29)
EOSINOPHIL # BLD AUTO: 0.1 10E3/UL (ref 0–0.7)
EOSINOPHIL NFR BLD AUTO: 2 %
ERYTHROCYTE [DISTWIDTH] IN BLOOD BY AUTOMATED COUNT: 16.5 % (ref 10–15)
GFR SERPL CREATININE-BSD FRML MDRD: >90 ML/MIN/1.73M2
GLUCOSE SERPL-MCNC: 121 MG/DL (ref 70–99)
HBA1C MFR BLD: 6.2 % (ref 0–5.6)
HCT VFR BLD AUTO: 36 % (ref 35–47)
HDLC SERPL-MCNC: 57 MG/DL
HGB BLD-MCNC: 12 G/DL (ref 11.7–15.7)
IMM GRANULOCYTES # BLD: 0 10E3/UL
IMM GRANULOCYTES NFR BLD: 0 %
LDLC SERPL CALC-MCNC: 105 MG/DL
LYMPHOCYTES # BLD AUTO: 1.9 10E3/UL (ref 0.8–5.3)
LYMPHOCYTES NFR BLD AUTO: 49 %
MCH RBC QN AUTO: 24.6 PG (ref 26.5–33)
MCHC RBC AUTO-ENTMCNC: 33.3 G/DL (ref 31.5–36.5)
MCV RBC AUTO: 74 FL (ref 78–100)
MONOCYTES # BLD AUTO: 0.3 10E3/UL (ref 0–1.3)
MONOCYTES NFR BLD AUTO: 8 %
NEUTROPHILS # BLD AUTO: 1.6 10E3/UL (ref 1.6–8.3)
NEUTROPHILS NFR BLD AUTO: 41 %
NONHDLC SERPL-MCNC: 121 MG/DL
PLATELET # BLD AUTO: 236 10E3/UL (ref 150–450)
POTASSIUM SERPL-SCNC: 4.2 MMOL/L (ref 3.4–5.3)
RBC # BLD AUTO: 4.87 10E6/UL (ref 3.8–5.2)
SODIUM SERPL-SCNC: 142 MMOL/L (ref 136–145)
TRIGL SERPL-MCNC: 81 MG/DL
WBC # BLD AUTO: 4 10E3/UL (ref 4–11)

## 2023-05-10 PROCEDURE — 90471 IMMUNIZATION ADMIN: CPT | Performed by: INTERNAL MEDICINE

## 2023-05-10 PROCEDURE — 83036 HEMOGLOBIN GLYCOSYLATED A1C: CPT | Performed by: INTERNAL MEDICINE

## 2023-05-10 PROCEDURE — 85025 COMPLETE CBC W/AUTO DIFF WBC: CPT | Performed by: INTERNAL MEDICINE

## 2023-05-10 PROCEDURE — 90715 TDAP VACCINE 7 YRS/> IM: CPT | Performed by: INTERNAL MEDICINE

## 2023-05-10 PROCEDURE — 36415 COLL VENOUS BLD VENIPUNCTURE: CPT | Performed by: INTERNAL MEDICINE

## 2023-05-10 PROCEDURE — 99213 OFFICE O/P EST LOW 20 MIN: CPT | Mod: 25 | Performed by: INTERNAL MEDICINE

## 2023-05-10 PROCEDURE — 99396 PREV VISIT EST AGE 40-64: CPT | Mod: 25 | Performed by: INTERNAL MEDICINE

## 2023-05-10 PROCEDURE — 80061 LIPID PANEL: CPT | Performed by: INTERNAL MEDICINE

## 2023-05-10 PROCEDURE — 80048 BASIC METABOLIC PNL TOTAL CA: CPT | Performed by: INTERNAL MEDICINE

## 2023-05-10 RX ORDER — CELECOXIB 100 MG/1
100 CAPSULE ORAL 2 TIMES DAILY
Qty: 60 CAPSULE | Refills: 1 | Status: SHIPPED | OUTPATIENT
Start: 2023-05-10

## 2023-05-10 ASSESSMENT — ENCOUNTER SYMPTOMS
MYALGIAS: 0
BREAST MASS: 0

## 2023-05-10 ASSESSMENT — PAIN SCALES - GENERAL: PAINLEVEL: EXTREME PAIN (9)

## 2023-05-10 NOTE — PROGRESS NOTES
SUBJECTIVE:   CC: Minerva is an 47 year old who presents for preventive health visit.        View : No data to display.              Patient has been advised of split billing requirements and indicates understanding: Yes  Healthy Habits:     Getting at least 3 servings of Calcium per day:  Yes    Bi-annual eye exam:  NO    Dental care twice a year:  NO    Sleep apnea or symptoms of sleep apnea:  None    Diet:  Regular (no restrictions)    Frequency of exercise:  4-5 days/week    Duration of exercise:  45-60 minutes    Taking medications regularly:  Yes    Medication side effects:  None    PHQ-2 Total Score: 0    Additional concerns today:  No              Today's PHQ-2 Score:       5/10/2023     7:18 AM   PHQ-2 ( 1999 [employer])   Q1: Little interest or pleasure in doing things 0   Q2: Feeling down, depressed or hopeless 0   PHQ-2 Score 0   Q1: Little interest or pleasure in doing things Not at all   Q2: Feeling down, depressed or hopeless Not at all   PHQ-2 Score 0       Have you ever done Advance Care Planning? (For example, a Health Directive, POLST, or a discussion with a medical provider or your loved ones about your wishes): No, advance care planning information given to patient to review.  Patient plans to discuss their wishes with loved ones or provider.      Social History     Tobacco Use     Smoking status: Never     Smokeless tobacco: Never   Vaping Use     Vaping status: Never Used   Substance Use Topics     Alcohol use: Yes     Comment: 1-11 a year             5/10/2023     7:18 AM   Alcohol Use   Prescreen: >3 drinks/day or >7 drinks/week? Not Applicable          View : No data to display.              Reviewed orders with patient.  Reviewed health maintenance and updated orders accordingly - Yes  Lab work is in process    Breast Cancer Screening:    FHS-7:        View : No data to display.              click delete button to remove this line now  Mammogram Screening: Recommended annual  "mammography  Pertinent mammograms are reviewed under the imaging tab.    History of abnormal Pap smear: NO - age 30-65 PAP every 5 years with negative HPV co-testing recommended      Latest Ref Rng & Units 10/31/2018     3:30 PM 10/31/2018     3:17 PM 7/18/2014    12:00 AM   PAP / HPV   PAP (Historical)   NIL   NIL     HPV 16 DNA NEG^Negative Negative       HPV 18 DNA NEG^Negative Negative       Other HR HPV NEG^Negative Negative         Reviewed and updated as needed this visit by clinical staff   Tobacco  Allergies               Reviewed and updated as needed this visit by Provider                     Review of Systems   Breasts:  Negative for tenderness, breast mass and discharge.   Genitourinary: Negative for pelvic pain, vaginal bleeding and vaginal discharge.   Musculoskeletal: Negative for myalgias.          OBJECTIVE:   BP (!) 143/97 (BP Location: Right arm, Patient Position: Sitting, Cuff Size: Adult Large)   Pulse 87   Temp 99  F (37.2  C) (Oral)   Resp 20   Ht 1.765 m (5' 9.49\")   Wt 138.1 kg (304 lb 8 oz)   SpO2 96%   BMI 44.34 kg/m    Physical Exam  GENERAL: healthy, alert and no distress  EYES: Eyes grossly normal to inspection, PERRL and conjunctivae and sclerae normal  HENT: ear canals and TM's normal, nose and mouth without ulcers or lesions  NECK: no adenopathy, no asymmetry, masses, or scars and thyroid normal to palpation  RESP: lungs clear to auscultation - no rales, rhonchi or wheezes  CV: regular rate and rhythm, normal S1 S2, no S3 or S4, no murmur, click or rub, no peripheral edema and peripheral pulses strong  ABDOMEN: soft, nontender, no hepatosplenomegaly, no masses and bowel sounds normal  MS: Tender on palpation of the lower back on the right side of this paraspinal region in the right sacroiliac joint area  Straight leg raising test positive on right side at 40 degrees  SKIN: no suspicious lesions or rashes  NEURO: Normal strength and tone, mentation intact and speech " normal  PSYCH: mentation appears normal, affect normal/bright    Diagnostic Test Results:  Labs reviewed in Epic    ASSESSMENT/PLAN:   (Z00.00) Routine general medical examination at a health care facility  (primary encounter diagnosis)  Comment: Her blood pressure slightly elevated today  We discussed about diet and exercise  Advised her to monitor her blood pressure at home  We also discussed hepatitis B vaccination  She is going to think about it  She had Tdap in 2014  She is due for a Pap smear seen October this year  Advised to set up an appointment early January next year for a physical so that she can get the Pap smear at that point  Discussed about DASH diet for elevated blood pressure  If the blood pressure is still elevated when she is monitoring at home advised to  make an appointment to discuss the treatment options  Plan: Basic metabolic panel  (Ca, Cl, CO2, Creat,         Gluc, K, Na, BUN)            (Z12.11) Screen for colon cancer  Comment:   Plan: Colonoscopy Screening  Referral            (Z12.31) Visit for screening mammogram  Comment:   Plan: MA SCREENING DIGITAL BILAT - Future  (s+30)            (Z13.220) Screening for hyperlipidemia  Comment:   Plan: Lipid panel reflex to direct LDL Fasting            (Z13.1) Screening for diabetes mellitus  Comment:   Plan: Hemoglobin A1c            (M54.50,  G89.29) Chronic right-sided low back pain without sciatica  Comment: No history of any trauma  No history of any spine surgeries in the past  On examination there is tenderness on palpation of the right sacroiliac joint straight leg raising test is positive on the right side at 40 degrees  Plan: celecoxib (CELEBREX) 100 MG capsule, Physical         Therapy Referral, XR Lumbar Spine 2/3 Views,         CBC with platelets and differential              Patient has been advised of split billing requirements and indicates understanding: No      COUNSELING:  Reviewed preventive health counseling, as  reflected in patient instructions       Regular exercise       Healthy diet/nutrition       Vision screening       Immunizations    Declined: Hepatitis B due to Other                Colorectal Cancer Screening        She reports that she has never smoked. She has never used smokeless tobacco.          Ricardo Fox MD  [facility]

## 2023-05-23 ENCOUNTER — ANCILLARY PROCEDURE (OUTPATIENT)
Dept: GENERAL RADIOLOGY | Facility: OTHER | Age: 48
End: 2023-05-23
Attending: INTERNAL MEDICINE
Payer: COMMERCIAL

## 2023-05-23 ENCOUNTER — MYC MEDICAL ADVICE (OUTPATIENT)
Dept: FAMILY MEDICINE | Facility: CLINIC | Age: 48
End: 2023-05-23

## 2023-05-23 DIAGNOSIS — G89.29 CHRONIC RIGHT-SIDED LOW BACK PAIN WITHOUT SCIATICA: ICD-10-CM

## 2023-05-23 DIAGNOSIS — M54.50 CHRONIC RIGHT-SIDED LOW BACK PAIN WITHOUT SCIATICA: Primary | ICD-10-CM

## 2023-05-23 DIAGNOSIS — G89.29 CHRONIC RIGHT-SIDED LOW BACK PAIN WITHOUT SCIATICA: Primary | ICD-10-CM

## 2023-05-23 DIAGNOSIS — M54.50 CHRONIC RIGHT-SIDED LOW BACK PAIN WITHOUT SCIATICA: ICD-10-CM

## 2023-05-23 PROCEDURE — 72100 X-RAY EXAM L-S SPINE 2/3 VWS: CPT | Mod: TC | Performed by: RADIOLOGY

## 2023-06-05 ENCOUNTER — THERAPY VISIT (OUTPATIENT)
Dept: PHYSICAL THERAPY | Facility: CLINIC | Age: 48
End: 2023-06-05
Attending: INTERNAL MEDICINE
Payer: COMMERCIAL

## 2023-06-05 DIAGNOSIS — M54.50 CHRONIC RIGHT-SIDED LOW BACK PAIN WITHOUT SCIATICA: ICD-10-CM

## 2023-06-05 DIAGNOSIS — G89.29 CHRONIC RIGHT-SIDED LOW BACK PAIN WITHOUT SCIATICA: ICD-10-CM

## 2023-06-05 PROCEDURE — 97110 THERAPEUTIC EXERCISES: CPT | Mod: GP | Performed by: PHYSICAL THERAPIST

## 2023-06-05 PROCEDURE — 97161 PT EVAL LOW COMPLEX 20 MIN: CPT | Mod: GP | Performed by: PHYSICAL THERAPIST

## 2023-06-05 NOTE — PROGRESS NOTES
PHYSICAL THERAPY EVALUATION  Type of Visit: Evaluation    See electronic medical record for Abuse and Falls Screening details.    Subjective      Presenting condition or subjective complaint: B LBP  Date of onset:  (2019) 2019  Relevant medical history: Overweight   Dates & types of surgery: Gallbladder    Prior diagnostic imaging/testing results: X-ray 5/23/23 R conves curve L2-3   Prior therapy history for the same diagnosis, illness or injury: No      Prior Level of Function       Living Environment  Social support: With a significant other or spouse   Type of home: [facility]   Stairs to enter the home: Yes 2 Is there a railing: No   Ramp: No   Stairs inside the home: Yes 2 Is there a railing: No   Help at home: Home management tasks (cooking, cleaning); Medication and/or finances; Home and Yard maintenance tasks; Assist for driving and community activities  Equipment owned:       Employment: Yes  - sitting  Hobbies/Interests: outdoors    Patient goals for therapy: Sit and stand for longer periods    Pain assessment: See objective evaluation for additional pain details     Objective   LUMBAR SPINE EVALUATION  PAIN: Pain Level at Rest: 5/10 and Pain level with activity 8-10/10  INTEGUMENTARY (edema, incisions): WNL  POSTURE: WNL  Weight Bearing Alignment: R Convex curve lumbar  Non-Weight Bearing Alignment: WNL   ROM: WNL  (Degrees) Left AROM Left PROM  Right AROM Right PROM   Hip Flexion       Hip Extension       Hip Abduction       Hip Adduction                     Knee Flexion       Knee Extension       Lumbar Side glide     Lumbar Flexion    Lumbar Extension        PELVIC/SI SCREEN: WNL  Strength: Fair Core StrengtH  MYOTOMES: WNL  DTR S: WNL  NEURAL TENSION: Lumbar WNL  FLEXIBILITY: Tight piriformis B  LUMBAR/HIP Special Tests: WNL   PELVIS/SI SPECIAL TESTS: WNL  PALPATION: TTP B Lumbar paraspinals worsening at each PSIS       Assessment & Plan   CLINICAL IMPRESSIONS   Medical Diagnosis:  Chronic R LBP w/o sciatica    Treatment Diagnosis: Chronic B LBP w/o sciatica   Impression/Assessment: Patient is a 47 year old female with B LBP complaints.  The following significant findings have been identified: Pain, Decreased strength, Inflammation, Impaired muscle performance and Decreased activity tolerance. These impairments interfere with their ability to perform self care tasks, work tasks, recreational activities, household chores, household mobility and community mobility as compared to previous level of function.     Clinical Decision Making (Complexity):   Clinical Presentation: Stable/Uncomplicated  Clinical Presentation Rationale: based on medical and personal factors listed in PT evaluation  Clinical Decision Making (Complexity): Low complexity    PLAN OF CARE  Treatment Interventions:  Modalities: Ultrasound  Interventions: Manual Therapy, Neuromuscular Re-education, Therapeutic Activity, Therapeutic Exercise, Self-Care/Home Management    Long Term Goals     PT Goal 1  Goal Identifier: STG #1- Sitting and Standing  Goal Description: 1 hour with PL = 3/10  Rationale: to maximize safety and independence with performance of ADLs and functional tasks;to maximize safety and independence within the home;to maximize safety and independence within the community;to maximize safety and independence with transportation;to maximize safety and independence with self cares  Target Date: 07/03/23  PT Goal 2  Goal Identifier: LTG #1 - Sit and Stand  Goal Description: > 1 hour w/o pain  Rationale: to maximize safety and independence with performance of ADLs and functional tasks;to maximize safety and independence within the home;to maximize safety and independence within the community;to maximize safety and independence with transportation;to maximize safety and independence with self cares  Target Date: 07/31/23      Frequency of Treatment: 1 x week  Duration of Treatment: 8 weeks    Recommended Referrals to  Other Professionals: Physical Therapy  Education Assessment:        Risks and benefits of evaluation/treatment have been explained.   Patient/Family/caregiver agrees with Plan of Care.     Evaluation Time:     PT Eval, Low Complexity Minutes (84614): 20      Signing Clinician: Sabrina Tubbs, PT

## 2023-06-12 ENCOUNTER — THERAPY VISIT (OUTPATIENT)
Dept: PHYSICAL THERAPY | Facility: CLINIC | Age: 48
End: 2023-06-12
Attending: INTERNAL MEDICINE
Payer: COMMERCIAL

## 2023-06-12 DIAGNOSIS — G89.29 CHRONIC RIGHT-SIDED LOW BACK PAIN WITHOUT SCIATICA: Primary | ICD-10-CM

## 2023-06-12 DIAGNOSIS — M54.50 CHRONIC RIGHT-SIDED LOW BACK PAIN WITHOUT SCIATICA: Primary | ICD-10-CM

## 2023-06-12 PROCEDURE — 97110 THERAPEUTIC EXERCISES: CPT | Mod: GP | Performed by: PHYSICAL THERAPIST

## 2023-06-12 PROCEDURE — 97140 MANUAL THERAPY 1/> REGIONS: CPT | Mod: GP | Performed by: PHYSICAL THERAPIST

## 2023-06-21 ENCOUNTER — THERAPY VISIT (OUTPATIENT)
Dept: PHYSICAL THERAPY | Facility: CLINIC | Age: 48
End: 2023-06-21
Payer: COMMERCIAL

## 2023-06-21 DIAGNOSIS — G89.29 CHRONIC RIGHT-SIDED LOW BACK PAIN WITHOUT SCIATICA: Primary | ICD-10-CM

## 2023-06-21 DIAGNOSIS — M54.50 CHRONIC RIGHT-SIDED LOW BACK PAIN WITHOUT SCIATICA: Primary | ICD-10-CM

## 2023-06-21 PROCEDURE — 97110 THERAPEUTIC EXERCISES: CPT | Mod: GP | Performed by: PHYSICAL THERAPIST

## 2023-06-21 PROCEDURE — 97112 NEUROMUSCULAR REEDUCATION: CPT | Mod: GP | Performed by: PHYSICAL THERAPIST

## 2023-06-28 ENCOUNTER — THERAPY VISIT (OUTPATIENT)
Dept: PHYSICAL THERAPY | Facility: CLINIC | Age: 48
End: 2023-06-28
Payer: COMMERCIAL

## 2023-06-28 DIAGNOSIS — G89.29 CHRONIC RIGHT-SIDED LOW BACK PAIN WITHOUT SCIATICA: Primary | ICD-10-CM

## 2023-06-28 DIAGNOSIS — M54.50 CHRONIC RIGHT-SIDED LOW BACK PAIN WITHOUT SCIATICA: Primary | ICD-10-CM

## 2023-06-28 PROCEDURE — 97112 NEUROMUSCULAR REEDUCATION: CPT | Mod: GP | Performed by: PHYSICAL THERAPIST

## 2023-06-28 PROCEDURE — 97110 THERAPEUTIC EXERCISES: CPT | Mod: GP | Performed by: PHYSICAL THERAPIST

## 2023-07-07 ENCOUNTER — THERAPY VISIT (OUTPATIENT)
Dept: PHYSICAL THERAPY | Facility: CLINIC | Age: 48
End: 2023-07-07
Payer: COMMERCIAL

## 2023-07-07 DIAGNOSIS — G89.29 CHRONIC RIGHT-SIDED LOW BACK PAIN WITHOUT SCIATICA: Primary | ICD-10-CM

## 2023-07-07 DIAGNOSIS — M54.50 CHRONIC RIGHT-SIDED LOW BACK PAIN WITHOUT SCIATICA: Primary | ICD-10-CM

## 2023-07-07 PROCEDURE — 97140 MANUAL THERAPY 1/> REGIONS: CPT | Mod: GP | Performed by: PHYSICAL THERAPIST

## 2023-07-07 PROCEDURE — 97110 THERAPEUTIC EXERCISES: CPT | Mod: GP | Performed by: PHYSICAL THERAPIST

## 2023-07-07 PROCEDURE — 97112 NEUROMUSCULAR REEDUCATION: CPT | Mod: GP | Performed by: PHYSICAL THERAPIST

## 2023-09-27 NOTE — PROGRESS NOTES
07/07/23 0500   Appointment Info   Signing clinician's name / credentials Tanvi Tubbs PT   Total/Authorized Visits 8 ([facility] Dr. Fox)   Visits Used 5   Medical Diagnosis Chronic R LBP w/o sciatica   PT Tx Diagnosis Chronic B LBP w/o sciatica   Progress Note/Certification   Onset of illness/injury or Date of Surgery   (2019)   Therapy Frequency 1 x week   Predicted Duration 8 weeks   Progress Note Completed Date 09/27/23   GOALS   PT Goals 2   PT Goal 1   Goal Identifier STG #1- Sitting and Standing   Goal Description 1 hour with PL = 0-2/10   Rationale to maximize safety and independence with performance of ADLs and functional tasks;to maximize safety and independence within the home;to maximize safety and independence within the community;to maximize safety and independence with transportation;to maximize safety and independence with self cares   Goal Progress Acheived   Target Date 07/03/23   Date Met 06/21/23   PT Goal 2   Goal Identifier LTG #1 - Sit and Stand   Goal Description > 1 hour w/o pain   Rationale to maximize safety and independence with performance of ADLs and functional tasks;to maximize safety and independence within the home;to maximize safety and independence within the community;to maximize safety and independence with transportation;to maximize safety and independence with self cares   Target Date 07/31/23   Subjective Report   Subjective Report Minerva reports mild burning = 2/10 on right LB.   Objective Measures   Objective Measures Objective Measure 1   Objective Measure 1   Objective Measure Tight R hip flex and piriformis.   Treatment Interventions (PT)   Interventions Manual Therapy;Therapeutic Procedure/Exercise;Therapeutic Activity;Neuromuscular Re-education   Therapeutic Procedure/Exercise   Therapeutic Procedures: strength, endurance, ROM, flexibillity minutes (84436) 20   PTRx Ther Proc 1 Prone Press Ups   PTRx Ther Proc 1 - Details 10   PTRx Ther Proc 2 Standing Extension at  Counter Supported   PTRx Ther Proc 2 - Details 10   PTRx Ther Proc 3 Standing Extension   PTRx Ther Proc 3 - Details 10   PTRx Ther Proc 4 Pretzel Stretch   PTRx Ther Proc 4 - Details 1 x 30 sec each leg with assist (and towel if needed)   PTRx Ther Proc 5 Ball Stabilization Prone Alternating Arm Extension   PTRx Ther Proc 5 - Details 5 x 5 sec each arm   PTRx Ther Proc 6 Ball Stabilization Prone Single Leg Extension   PTRx Ther Proc 6 - Details 5 x 5 sec each leg   PTRx Ther Proc 7 Ball Stabilization Prone Alternating Arm and Leg Extension   PTRx Ther Proc 7 - Details 5 x 5 sec each side   PTRx Ther Proc 8 Wall Sit with Ball   PTRx Ther Proc 8 - Details 10 with 5 sec hold (may add arms in goal post position tightening shoulder blades)   Ther Proc 1 Supine with R leg off table & L knee to chest  (foot supported on stool if needed)   Ther Proc 1 - Details R Hip flex stretch x 30 sec   Neuromuscular Re-education   Neuromuscular re-ed of mvmt, balance, coord, kinesthetic sense, posture, proprioception minutes (32518) 10   PTRx Neuro Re-ed 1 Posterior Pelvic Tilt   PTRx Neuro Re-ed 1 - Details 10 x 5 sec   PTRx Neuro Re-ed 2 Supine Abdominal Exercise #1 (Arm Extension)   PTRx Neuro Re-ed 2 - Details 5-10 x 5 sec using green band with pull down   PTRx Neuro Re-ed 3 Ball Exercise Supine Bridging   PTRx Neuro Re-ed 3 - Details 10 x 5 sec    PTRx Neuro Re-ed 4 Supine Abdominal Exercise #3B (Two Leg Marching)   PTRx Neuro Re-ed 4 - Details 5-10 sets   Manual Therapy   Manual Therapy: Mobilization, MFR, MLD, friction massage minutes (99652) 10   Manual Therapy 1 Prone Piriformis release and manual hip flex stretch prone with left leg off table.  (Tennis ball at home.)   Plan   Home program PTRX   Plan for next session Reasses hipf flex and piriformis R LE   Comments   Comments DC next - August 3?   Total Session Time   Timed Code Treatment Minutes 40   Total Treatment Time (sum of timed and untimed services) 40        DISCHARGE  Reason for Discharge: Patient has failed to schedule further appointments.    Equipment Issued: none    Discharge Plan: Patient to continue home program.    Referring Provider:  Ricardo Fox

## 2023-10-01 ENCOUNTER — HEALTH MAINTENANCE LETTER (OUTPATIENT)
Age: 48
End: 2023-10-01

## 2023-11-07 NOTE — TELEPHONE ENCOUNTER
SPINE PATIENTS - NEW PROTOCOL PREVISIT    RECORDS RECEIVED FROM: Internal   REASON FOR VISIT: Chronic right-sided low back pain without sciatica/    Date of Appt: 11/29/2023    NOTES (FOR ALL VISITS) STATUS DETAILS   OFFICE NOTE from referring provider Internal 05/10/2023 Dr Fox [facility]    OFFICE NOTE from other specialist Internal 07/07/2023 PT [facility]    DISCHARGE SUMMARY from hospital N/A    DISCHARGE REPORT from ER N/A    OPERATIVE REPORT N/A    EMG REPORT N/A    MEDICATION LIST N/A    IMAGING  (FOR ALL VISITS)     MRI (HEAD, NECK, SPINE) N/A    XRAY (SPINE) *NEUROSURGERY* Internal 05/23/2023 lumbar spine   CT (HEAD, NECK, SPINE) N/A

## 2023-11-21 NOTE — PROGRESS NOTES
"    SUBJECTIVE:  HPI:  Minerva Andrade  Is a 48 year old female who presents today for new patient evaluation of chronic right low back pain upon referral from Dr. Ricardo Fox, whose 5/10/2023 office note records:  \"No history of any trauma  No history of any spine surgeries in the past  On examination there is tenderness on palpation of the right sacroiliac joint straight leg raising test is positive on the right side at 40 degrees.\"  PT was ordered.  Celecoxib prescription.  CBC was normal.  Lumbar x-rays 5/23/2023: Facet arthropathy L3-S1      History today:    The patient confirms the above and indicates that her pain has been ongoing without an inciting event for for 5 years.  She did complete PT and when she compliance with home exercises she gets temporary relief but does not tolerate prolonged walking, or standing.  Most the time there is no leg symptoms but occasionally she will get it in the anterior right thigh but not past the knee.  She specifically denies any leg numbness, or weakness, bowel or bladder dysfunction, or saddle anesthesia.    Pain score and diagram reviewed.  See questionnaire.      ROS: .  Otherwise negative for bowel/bladder dysfunction, balance changes, headache, leg pain/numbness/weakness, fevers, chills, night sweats, unexplained weight loss;  otherwise unremarkable.   See the patient's intake questionnaire from today for details.    Treatment to Date: PT.  No chiropractic    MEDICATIONS:  Reviewed.    ALLERGIES:  Reviewed.     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  Sports hobbies and activities: Walking, yoga.  Intending to start martial arts.  She is originally from [place] having lived in the  for 10 years and is trained as a construction (civil) .      OBJECTIVE:    IMAGING: Images and reports reviewed.    LUMBAR SPINE TWO TO THREE VIEWS " May 23, 2023                                                                  IMPRESSION: Five lumbar type vertebrae. Moderate right convex  curvature of the lumbar spine centered at L2-L3. Alignment otherwise  normal. Vertebral body heights normal. No fractures. Facet arthropathy  at L3-L4, L4-L5 and L5-S1.    EXAMINATION:    --CONSTITUTIONAL:   No acute distress.  The patient is well nourished and well groomed.  She is powerfully built with an elevated BMI.  She transitions without pushoff and moves fluidly about the room.  --SKIN:  Skin over the face, bilateral lower extremities, and posterior torso is clean, dry, intact without rashes.    --GAIT:  is non-antalgic. Flat foot, heel and toe walking:  normal   .  Squat and rise   normal    .  --STANDING EXAMINATION:    Symmetry of spine/pelvis   unremarkable   .      Range of motion full fluid and painless.   Standing flexion     positive right.    Darwin's sign   negative    .     Stork test   negative    .  Positive right Darwin finger sign  --NEUROLOGICAL:     ROMBERG, TANDEM WALK, PRONATOR DRIFT:   Normal.   .  SENSATION to light touch is intact in bilateral thighs, lower legs and feet.   REFLEXES:  patellar 2+, and achilles 2+.  Babinski is negative. No clonus.  MANUAL MOTOR TESTING:  L1- S1 Myotomes, Femoral, Obturator, Peroneal and Tibial nerves 5/5   DURAL STRETCH TESTS:  SLR negative.   --PELVIC/HIP JOINTS:                Long Sitting right short to long.      Spring testing negative SI and lumbar.      PELVIC ALIGNMENT right posterior innominate rotation.   --LUMBAR/GLUTEAL MUSCLES: No tenderness spasms or trigger point.    Procedure note-OMT:  Manual medicine restored normal pelvic alignment.  Extension range improved.  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  Follow-up as needed.    Advised patient to call or return early if symptoms worsen, or having problems controlling bladder and bowel function or worsening leg weakness.     Please note: Voice recognition software was used in this dictation.  It may therefore contain typographical errors.    David Rome MD

## 2023-11-29 ENCOUNTER — OFFICE VISIT (OUTPATIENT)
Dept: NEUROSURGERY | Facility: CLINIC | Age: 48
End: 2023-11-29
Payer: COMMERCIAL

## 2023-11-29 ENCOUNTER — PRE VISIT (OUTPATIENT)
Dept: NEUROSURGERY | Facility: CLINIC | Age: 48
End: 2023-11-29

## 2023-11-29 VITALS
HEIGHT: 70 IN | DIASTOLIC BLOOD PRESSURE: 87 MMHG | HEART RATE: 94 BPM | BODY MASS INDEX: 41.95 KG/M2 | SYSTOLIC BLOOD PRESSURE: 152 MMHG | WEIGHT: 293 LBS

## 2023-11-29 DIAGNOSIS — M54.50 CHRONIC RIGHT-SIDED LOW BACK PAIN WITHOUT SCIATICA: ICD-10-CM

## 2023-11-29 DIAGNOSIS — G89.29 CHRONIC RIGHT-SIDED LOW BACK PAIN WITHOUT SCIATICA: ICD-10-CM

## 2023-11-29 DIAGNOSIS — M99.05 SOMATIC DYSFUNCTION OF PELVIS REGION: Primary | ICD-10-CM

## 2023-11-29 PROCEDURE — 98925 OSTEOPATH MANJ 1-2 REGIONS: CPT | Performed by: PREVENTIVE MEDICINE

## 2023-11-29 PROCEDURE — 99203 OFFICE O/P NEW LOW 30 MIN: CPT | Mod: 25 | Performed by: PREVENTIVE MEDICINE

## 2023-11-29 RX ORDER — ACETAMINOPHEN 325 MG/1
325-650 TABLET ORAL EVERY 6 HOURS PRN
COMMUNITY

## 2023-11-29 ASSESSMENT — PAIN SCALES - GENERAL: PAINLEVEL: NO PAIN (1)

## 2023-11-29 NOTE — NURSING NOTE
"Reason For Visit:   Chief Complaint   Patient presents with    Consult     Low back pain         Occupation: Customer Service  Currently working? Yes.  Work status?  Full time.    Sports: Yoga  Activities: walking             BP (!) 152/87   Pulse 94   Ht 1.778 m (5' 10\")   Wt 135.6 kg (299 lb)   BMI 42.90 kg/m        No Known Allergies    Current Outpatient Medications   Medication    acetaminophen (TYLENOL) 325 MG tablet    celecoxib (CELEBREX) 100 MG capsule     No current facility-administered medications for this visit.         Darla Severin-Brown, LPN   "

## 2023-11-29 NOTE — PATIENT INSTRUCTIONS
"I am glad you came in to see me so we could identify your Pelvic Joint Dysfunction.  We will start a different type of treatment with Liza Hilligoradha and let me know if you need my help again.  See the assessment and plan below for further details of our discussion today.  Do the self-correction I taught you each morning as described below.  Do some easy walking.  I would hold off on the martial arts for at least a month after you start PT.    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  Follow-up as needed.        PELVIC JOINT SELF CORRECTION EXERCISES      It is best to do this first thing in the morning, and can be repeated once or twice during the day.    \"SHOTGUN\" TECHNIQUE:  This loosens up the front and back of the pelvis.  Do this before the Broomstick exercise.  Use on object such as a rectangular laundry basket.  Lie on your back with your knees bent, feet together and flat on the floor.    Spread your knees approximately 12-24 inches around the outside of an upright laundry basket.  Squeeze your knees together, breathing as you do this.    Concentrate on keeping your buttocks relaxed and on the ground.    A brief discomfort in the front of the pelvis and even a popping sound is normal.  Hold the squeeze for 3-5 seconds.    Relax for 3-5 seconds.    Repeat 2 more times.    Now reverse your knee position to the inside of the upside down laundry basket while still lying with your knees bent up, feet on the ground.  Pull your knees apart, breathing easy as you do this.  Hold the squeeze for 3-5 seconds.    Relax for 3-5 seconds.    Repeat 2 more times.    BROOMSTICK EXERCISE:  Lie on your back with your knees bent.  Slide a broomstick or similar object above one knee, and below the opposite " knee.  Firmly hold the stick with your hands will bringing your knees closed to your belly, preferably high enough that your back can rest flat on the ground.  Still holding the stick, scissors your legs against the stick, breathing as you do this.    (Push down to your foot with leg on top of the broomstick, and lift up to your chin with the leg below the broomstick).  Hold the squeeze for 3-5 seconds.    Relax for 3-5 seconds.    Repeat 2 more times.  Switch the position of the broomstick above the other knee, and below the first knee.  Repeat the exercise as above in this new position.

## 2023-11-29 NOTE — LETTER
"    11/29/2023         RE: Minerva Andrade  [address] [facility] [id number]        Dear Colleague,    Thank you for referring your patient, Minerva Andrade, to the [facility] [place]. Please see a copy of my visit note below.        SUBJECTIVE:  HPI:  Minerva Andrade  Is a 48 year old female who presents today for new patient evaluation of chronic right low back pain upon referral from Dr. Ricardo Fox, whose 5/10/2023 office note records:  \"No history of any trauma  No history of any spine surgeries in the past  On examination there is tenderness on palpation of the right sacroiliac joint straight leg raising test is positive on the right side at 40 degrees.\"  PT was ordered.  Celecoxib prescription.  CBC was normal.  Lumbar x-rays 5/23/2023: Facet arthropathy L3-S1      History today:    The patient confirms the above and indicates that her pain has been ongoing without an inciting event for for 5 years.  She did complete PT and when she compliance with home exercises she gets temporary relief but does not tolerate prolonged walking, or standing.  Most the time there is no leg symptoms but occasionally she will get it in the anterior right thigh but not past the knee.  She specifically denies any leg numbness, or weakness, bowel or bladder dysfunction, or saddle anesthesia.    Pain score and diagram reviewed.  See questionnaire.      ROS: .  Otherwise negative for bowel/bladder dysfunction, balance changes, headache, leg pain/numbness/weakness, fevers, chills, night sweats, unexplained weight loss;  otherwise unremarkable.   See the patient's intake questionnaire from today for details.    Treatment to Date: PT.  No chiropractic    MEDICATIONS:  Reviewed.    ALLERGIES:  Reviewed.     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  Sports hobbies and " activities: Walking, yoga.  Intending to start martial arts.  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      OBJECTIVE:    IMAGING: Images and reports reviewed.    LUMBAR SPINE TWO TO THREE VIEWS May 23, 2023                                                                  IMPRESSION: Five lumbar type vertebrae. Moderate right convex  curvature of the lumbar spine centered at L2-L3. Alignment otherwise  normal. Vertebral body heights normal. No fractures. Facet arthropathy  at L3-L4, L4-L5 and L5-S1.    EXAMINATION:    --CONSTITUTIONAL:   No acute distress.  The patient is well nourished and well groomed.  She is powerfully built with an elevated BMI.  She transitions without pushoff and moves fluidly about the room.  --SKIN:  Skin over the face, bilateral lower extremities, and posterior torso is clean, dry, intact without rashes.    --GAIT:  is non-antalgic. Flat foot, heel and toe walking:  normal   .  Squat and rise   normal    .  --STANDING EXAMINATION:    Symmetry of spine/pelvis   unremarkable   .      Range of motion full fluid and painless.   Standing flexion     positive right.    Darwin's sign   negative    .     Stork test   negative    .  Positive right Darwin finger sign  --NEUROLOGICAL:     ROMBERG, TANDEM WALK, PRONATOR DRIFT:   Normal.   .  SENSATION to light touch is intact in bilateral thighs, lower legs and feet.   REFLEXES:  patellar 2+, and achilles 2+.  Babinski is negative. No clonus.  MANUAL MOTOR TESTING:  L1- S1 Myotomes, Femoral, Obturator, Peroneal and Tibial nerves 5/5   DURAL STRETCH TESTS:  SLR negative.   --PELVIC/HIP JOINTS:                Long Sitting right short to long.      Spring testing negative SI and lumbar.      PELVIC ALIGNMENT right posterior innominate rotation.   --LUMBAR/GLUTEAL MUSCLES: No tenderness spasms or trigger point.    Procedure note-OMT:  Manual medicine restored normal pelvic  alignment.  Extension range improved.  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  Follow-up as needed.    Advised patient to call or return early if symptoms worsen, or having problems controlling bladder and bowel function or worsening leg weakness.     Please note: Voice recognition software was used in this dictation.  It may therefore contain typographical errors.    David Rome MD             Again, thank you for allowing me to participate in the care of your patient.        Sincerely,        David Rome MD

## 2024-07-07 ENCOUNTER — HEALTH MAINTENANCE LETTER (OUTPATIENT)
Age: 49
End: 2024-07-07

## 2024-12-24 ENCOUNTER — TRANSFERRED RECORDS (OUTPATIENT)
Dept: HEALTH INFORMATION MANAGEMENT | Facility: CLINIC | Age: 49
End: 2024-12-24

## 2025-07-19 ENCOUNTER — HEALTH MAINTENANCE LETTER (OUTPATIENT)
Age: 50
End: 2025-07-19

## (undated) DEVICE — TUBING INSUFFLATION W/FILTER CPC TO LUER 620-030-301

## (undated) DEVICE — ENDO SCOPE WARMER TM500

## (undated) DEVICE — DRAPE LAPAROSCOPIC ABDOMINAL ASTOUND 106X124" LF 9438

## (undated) DEVICE — SOL WATER IRRIG 1000ML BOTTLE 07139-09

## (undated) DEVICE — GLOVE PROTEXIS W/NEU-THERA 6.0  2D73TE60

## (undated) DEVICE — ENDO TROCAR BLADELESS 11X100MM B11LT

## (undated) DEVICE — DECANTER TRANSFER DEVICE 2008S

## (undated) DEVICE — ENDO POUCH 10MM POUCH

## (undated) DEVICE — CLIP APPLIER ENDO 05MM 176620

## (undated) DEVICE — SU VICRYL 4-0 PS-2 18" UND J496H

## (undated) DEVICE — SU VICRYL 0 UR-6 27" J603H

## (undated) DEVICE — DRSG TEGADERM 2 3/8X2 3/4" 1624W

## (undated) DEVICE — PREP CHLORAPREP 26ML TINTED ORANGE  260815

## (undated) DEVICE — DRSG STERI STRIP 1/2X4" R1547

## (undated) DEVICE — ENDO TROCAR BLADELESS 05X100MM B5LT

## (undated) DEVICE — ANTIFOG SOLUTION W/FOAM PAD CF-1001

## (undated) DEVICE — PACK MINOR SBA15MIFSE

## (undated) DEVICE — BLADE KNIFE SURG 11 371111

## (undated) RX ORDER — PROPOFOL 10 MG/ML
INJECTION, EMULSION INTRAVENOUS
Status: DISPENSED
Start: 2018-05-07

## (undated) RX ORDER — CEFAZOLIN SODIUM 1 G/3ML
INJECTION, POWDER, FOR SOLUTION INTRAMUSCULAR; INTRAVENOUS
Status: DISPENSED
Start: 2018-05-07

## (undated) RX ORDER — BUPIVACAINE HYDROCHLORIDE 2.5 MG/ML
INJECTION, SOLUTION EPIDURAL; INFILTRATION; INTRACAUDAL
Status: DISPENSED
Start: 2018-05-07

## (undated) RX ORDER — ACETAMINOPHEN 325 MG/1
TABLET ORAL
Status: DISPENSED
Start: 2018-05-07

## (undated) RX ORDER — LIDOCAINE HYDROCHLORIDE 20 MG/ML
INJECTION, SOLUTION EPIDURAL; INFILTRATION; INTRACAUDAL; PERINEURAL
Status: DISPENSED
Start: 2018-05-07

## (undated) RX ORDER — OXYCODONE HYDROCHLORIDE 5 MG/1
TABLET ORAL
Status: DISPENSED
Start: 2018-05-07

## (undated) RX ORDER — EPINEPHRINE 1 MG/ML
INJECTION, SOLUTION INTRAMUSCULAR; SUBCUTANEOUS
Status: DISPENSED
Start: 2018-05-07

## (undated) RX ORDER — MELOXICAM 15 MG/1
TABLET ORAL
Status: DISPENSED
Start: 2018-05-07

## (undated) RX ORDER — DEXAMETHASONE SODIUM PHOSPHATE 4 MG/ML
INJECTION, SOLUTION INTRA-ARTICULAR; INTRALESIONAL; INTRAMUSCULAR; INTRAVENOUS; SOFT TISSUE
Status: DISPENSED
Start: 2018-05-07

## (undated) RX ORDER — FENTANYL CITRATE 50 UG/ML
INJECTION, SOLUTION INTRAMUSCULAR; INTRAVENOUS
Status: DISPENSED
Start: 2018-05-07

## (undated) RX ORDER — GLYCOPYRROLATE 0.2 MG/ML
INJECTION INTRAMUSCULAR; INTRAVENOUS
Status: DISPENSED
Start: 2018-05-07

## (undated) RX ORDER — HEPARIN SODIUM 5000 [USP'U]/ML
INJECTION, SOLUTION INTRAVENOUS; SUBCUTANEOUS
Status: DISPENSED
Start: 2018-05-07

## (undated) RX ORDER — ONDANSETRON 2 MG/ML
INJECTION INTRAMUSCULAR; INTRAVENOUS
Status: DISPENSED
Start: 2018-05-07

## (undated) RX ORDER — NEOSTIGMINE METHYLSULFATE 1 MG/ML
VIAL (ML) INJECTION
Status: DISPENSED
Start: 2018-05-07

## (undated) RX ORDER — GABAPENTIN 300 MG/1
CAPSULE ORAL
Status: DISPENSED
Start: 2018-05-07